# Patient Record
Sex: MALE | Race: OTHER | HISPANIC OR LATINO | Employment: FULL TIME | URBAN - METROPOLITAN AREA
[De-identification: names, ages, dates, MRNs, and addresses within clinical notes are randomized per-mention and may not be internally consistent; named-entity substitution may affect disease eponyms.]

---

## 2019-01-14 ENCOUNTER — NON-PROVIDER VISIT (OUTPATIENT)
Dept: URGENT CARE | Facility: PHYSICIAN GROUP | Age: 39
End: 2019-01-14

## 2019-01-14 DIAGNOSIS — Z02.1 PRE-EMPLOYMENT DRUG SCREENING: ICD-10-CM

## 2019-01-14 LAB
AMP AMPHETAMINE: NORMAL
COC COCAINE: NORMAL
INT CON NEG: NORMAL
INT CON POS: NORMAL
MET METHAMPHETAMINES: NORMAL
OPI OPIATES: NORMAL
PCP PHENCYCLIDINE: NORMAL
POC DRUG COMMENT 753798-OCCUPATIONAL HEALTH: NORMAL
THC: NORMAL

## 2019-01-14 PROCEDURE — 80305 DRUG TEST PRSMV DIR OPT OBS: CPT | Performed by: PHYSICIAN ASSISTANT

## 2020-04-20 ENCOUNTER — HOSPITAL ENCOUNTER (EMERGENCY)
Facility: MEDICAL CENTER | Age: 40
End: 2020-04-20
Attending: EMERGENCY MEDICINE
Payer: MEDICAID

## 2020-04-20 ENCOUNTER — APPOINTMENT (OUTPATIENT)
Dept: RADIOLOGY | Facility: MEDICAL CENTER | Age: 40
End: 2020-04-20
Attending: EMERGENCY MEDICINE
Payer: MEDICAID

## 2020-04-20 VITALS
BODY MASS INDEX: 25.11 KG/M2 | HEIGHT: 67 IN | SYSTOLIC BLOOD PRESSURE: 147 MMHG | DIASTOLIC BLOOD PRESSURE: 108 MMHG | WEIGHT: 160 LBS | OXYGEN SATURATION: 95 % | HEART RATE: 128 BPM | TEMPERATURE: 97 F | RESPIRATION RATE: 16 BRPM

## 2020-04-20 DIAGNOSIS — S63.259A DISLOCATION OF FINGER, INITIAL ENCOUNTER: ICD-10-CM

## 2020-04-20 PROCEDURE — 302874 HCHG BANDAGE ACE 2 OR 3""

## 2020-04-20 PROCEDURE — 99283 EMERGENCY DEPT VISIT LOW MDM: CPT

## 2020-04-20 PROCEDURE — 700111 HCHG RX REV CODE 636 W/ 250 OVERRIDE (IP): Performed by: EMERGENCY MEDICINE

## 2020-04-20 PROCEDURE — 73140 X-RAY EXAM OF FINGER(S): CPT | Mod: LT

## 2020-04-20 PROCEDURE — A9270 NON-COVERED ITEM OR SERVICE: HCPCS | Performed by: EMERGENCY MEDICINE

## 2020-04-20 PROCEDURE — 700102 HCHG RX REV CODE 250 W/ 637 OVERRIDE(OP): Performed by: EMERGENCY MEDICINE

## 2020-04-20 PROCEDURE — 26770 TREAT FINGER DISLOCATION: CPT

## 2020-04-20 RX ORDER — IBUPROFEN 600 MG/1
600 TABLET ORAL ONCE
Status: COMPLETED | OUTPATIENT
Start: 2020-04-20 | End: 2020-04-20

## 2020-04-20 RX ORDER — IBUPROFEN 600 MG/1
600 TABLET ORAL EVERY 6 HOURS PRN
Qty: 30 TAB | Refills: 0 | Status: SHIPPED | OUTPATIENT
Start: 2020-04-20 | End: 2022-08-03

## 2020-04-20 RX ORDER — BUPIVACAINE HYDROCHLORIDE 2.5 MG/ML
10 INJECTION, SOLUTION EPIDURAL; INFILTRATION; INTRACAUDAL ONCE
Status: COMPLETED | OUTPATIENT
Start: 2020-04-20 | End: 2020-04-20

## 2020-04-20 RX ADMIN — BUPIVACAINE HYDROCHLORIDE 10 ML: 2.5 INJECTION, SOLUTION EPIDURAL; INFILTRATION; INTRACAUDAL; PERINEURAL at 22:15

## 2020-04-20 RX ADMIN — IBUPROFEN 600 MG: 600 TABLET ORAL at 22:22
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
## 2020-04-21 NOTE — DISCHARGE INSTRUCTIONS
Wear the splint for the next 2-3 weeks. Return if you have increasing pain, numbness, weakness, or cold blue finger.
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
## 2021-03-11 ENCOUNTER — HOSPITAL ENCOUNTER (OUTPATIENT)
Dept: RADIOLOGY | Facility: MEDICAL CENTER | Age: 41
End: 2021-03-11
Payer: MEDICAID

## 2021-03-11 ENCOUNTER — APPOINTMENT (OUTPATIENT)
Dept: RADIOLOGY | Facility: MEDICAL CENTER | Age: 41
DRG: 200 | End: 2021-03-11
Attending: EMERGENCY MEDICINE
Payer: MEDICAID

## 2021-03-11 ENCOUNTER — HOSPITAL ENCOUNTER (INPATIENT)
Facility: MEDICAL CENTER | Age: 41
LOS: 8 days | DRG: 200 | End: 2021-03-19
Attending: EMERGENCY MEDICINE | Admitting: SURGERY
Payer: MEDICAID

## 2021-03-11 ENCOUNTER — APPOINTMENT (OUTPATIENT)
Dept: RADIOLOGY | Facility: MEDICAL CENTER | Age: 41
DRG: 200 | End: 2021-03-11
Attending: SURGERY
Payer: MEDICAID

## 2021-03-11 DIAGNOSIS — J98.4 PNEUMATOCELE OF LUNG: ICD-10-CM

## 2021-03-11 DIAGNOSIS — S22.41XA CLOSED FRACTURE OF MULTIPLE RIBS OF RIGHT SIDE, INITIAL ENCOUNTER: ICD-10-CM

## 2021-03-11 DIAGNOSIS — S27.0XXA TRAUMATIC PNEUMOTHORAX, INITIAL ENCOUNTER: ICD-10-CM

## 2021-03-11 PROBLEM — J94.2 HEMOTHORAX: Status: ACTIVE | Noted: 2021-03-11

## 2021-03-11 PROBLEM — Z78.9 NO CONTRAINDICATION TO DEEP VEIN THROMBOSIS (DVT) PROPHYLAXIS: Status: ACTIVE | Noted: 2021-03-11

## 2021-03-11 PROBLEM — Z11.9 SCREENING EXAMINATION FOR INFECTIOUS DISEASE: Status: ACTIVE | Noted: 2021-03-11

## 2021-03-11 PROBLEM — S27.2XXA TRAUMATIC HEMOPNEUMOTHORAX: Status: ACTIVE | Noted: 2021-03-11

## 2021-03-11 PROBLEM — T14.90XA TRAUMA: Status: ACTIVE | Noted: 2021-03-11

## 2021-03-11 PROBLEM — S22.49XA MULTIPLE RIB FRACTURES INVOLVING FOUR OR MORE RIBS: Status: ACTIVE | Noted: 2021-03-11

## 2021-03-11 LAB
SARS-COV+SARS-COV-2 AG RESP QL IA.RAPID: NOTDETECTED
SARS-COV-2 RNA RESP QL NAA+PROBE: NOTDETECTED
SPECIMEN SOURCE: NORMAL
SPECIMEN SOURCE: NORMAL

## 2021-03-11 PROCEDURE — 700111 HCHG RX REV CODE 636 W/ 250 OVERRIDE (IP): Performed by: SURGERY

## 2021-03-11 PROCEDURE — 700111 HCHG RX REV CODE 636 W/ 250 OVERRIDE (IP): Performed by: EMERGENCY MEDICINE

## 2021-03-11 PROCEDURE — 71045 X-RAY EXAM CHEST 1 VIEW: CPT

## 2021-03-11 PROCEDURE — 96372 THER/PROPH/DIAG INJ SC/IM: CPT

## 2021-03-11 PROCEDURE — U0005 INFEC AGEN DETEC AMPLI PROBE: HCPCS

## 2021-03-11 PROCEDURE — 700102 HCHG RX REV CODE 250 W/ 637 OVERRIDE(OP): Performed by: SURGERY

## 2021-03-11 PROCEDURE — 700101 HCHG RX REV CODE 250: Performed by: NURSE PRACTITIONER

## 2021-03-11 PROCEDURE — U0003 INFECTIOUS AGENT DETECTION BY NUCLEIC ACID (DNA OR RNA); SEVERE ACUTE RESPIRATORY SYNDROME CORONAVIRUS 2 (SARS-COV-2) (CORONAVIRUS DISEASE [COVID-19]), AMPLIFIED PROBE TECHNIQUE, MAKING USE OF HIGH THROUGHPUT TECHNOLOGIES AS DESCRIBED BY CMS-2020-01-R: HCPCS

## 2021-03-11 PROCEDURE — 99285 EMERGENCY DEPT VISIT HI MDM: CPT

## 2021-03-11 PROCEDURE — C1729 CATH, DRAINAGE: HCPCS | Performed by: SURGERY

## 2021-03-11 PROCEDURE — 96374 THER/PROPH/DIAG INJ IV PUSH: CPT

## 2021-03-11 PROCEDURE — 32551 INSERTION OF CHEST TUBE: CPT

## 2021-03-11 PROCEDURE — 3E033GC INTRODUCTION OF OTHER THERAPEUTIC SUBSTANCE INTO PERIPHERAL VEIN, PERCUTANEOUS APPROACH: ICD-10-PCS | Performed by: EMERGENCY MEDICINE

## 2021-03-11 PROCEDURE — A9270 NON-COVERED ITEM OR SERVICE: HCPCS | Performed by: SURGERY

## 2021-03-11 PROCEDURE — 770006 HCHG ROOM/CARE - MED/SURG/GYN SEMI*

## 2021-03-11 PROCEDURE — C9803 HOPD COVID-19 SPEC COLLECT: HCPCS | Performed by: EMERGENCY MEDICINE

## 2021-03-11 PROCEDURE — 87426 SARSCOV CORONAVIRUS AG IA: CPT

## 2021-03-11 PROCEDURE — 0W9930Z DRAINAGE OF RIGHT PLEURAL CAVITY WITH DRAINAGE DEVICE, PERCUTANEOUS APPROACH: ICD-10-PCS | Performed by: SURGERY

## 2021-03-11 RX ORDER — OXYCODONE HYDROCHLORIDE 5 MG/1
5 TABLET ORAL
Status: DISCONTINUED | OUTPATIENT
Start: 2021-03-11 | End: 2021-03-19 | Stop reason: HOSPADM

## 2021-03-11 RX ORDER — LIDOCAINE 50 MG/G
1 PATCH TOPICAL EVERY 24 HOURS
Status: DISCONTINUED | OUTPATIENT
Start: 2021-03-11 | End: 2021-03-19 | Stop reason: HOSPADM

## 2021-03-11 RX ORDER — OXYCODONE HYDROCHLORIDE 10 MG/1
10 TABLET ORAL
Status: DISCONTINUED | OUTPATIENT
Start: 2021-03-11 | End: 2021-03-14

## 2021-03-11 RX ORDER — POLYETHYLENE GLYCOL 3350 17 G/17G
1 POWDER, FOR SOLUTION ORAL 2 TIMES DAILY
Status: DISCONTINUED | OUTPATIENT
Start: 2021-03-11 | End: 2021-03-19 | Stop reason: HOSPADM

## 2021-03-11 RX ORDER — IBUPROFEN 800 MG/1
800 TABLET ORAL 3 TIMES DAILY PRN
Status: DISCONTINUED | OUTPATIENT
Start: 2021-03-16 | End: 2021-03-19 | Stop reason: HOSPADM

## 2021-03-11 RX ORDER — ACETAMINOPHEN 500 MG
1000 TABLET ORAL EVERY 6 HOURS PRN
Status: DISCONTINUED | OUTPATIENT
Start: 2021-03-16 | End: 2021-03-19 | Stop reason: HOSPADM

## 2021-03-11 RX ORDER — AMOXICILLIN 250 MG
1 CAPSULE ORAL
Status: DISCONTINUED | OUTPATIENT
Start: 2021-03-11 | End: 2021-03-19 | Stop reason: HOSPADM

## 2021-03-11 RX ORDER — IBUPROFEN 800 MG/1
800 TABLET ORAL 3 TIMES DAILY
Status: COMPLETED | OUTPATIENT
Start: 2021-03-11 | End: 2021-03-16

## 2021-03-11 RX ORDER — ENEMA 19; 7 G/133ML; G/133ML
1 ENEMA RECTAL
Status: DISCONTINUED | OUTPATIENT
Start: 2021-03-11 | End: 2021-03-19 | Stop reason: HOSPADM

## 2021-03-11 RX ORDER — ONDANSETRON 2 MG/ML
4 INJECTION INTRAMUSCULAR; INTRAVENOUS EVERY 4 HOURS PRN
Status: DISCONTINUED | OUTPATIENT
Start: 2021-03-11 | End: 2021-03-19 | Stop reason: HOSPADM

## 2021-03-11 RX ORDER — ACETAMINOPHEN 500 MG
1000 TABLET ORAL EVERY 6 HOURS
Status: DISPENSED | OUTPATIENT
Start: 2021-03-11 | End: 2021-03-16

## 2021-03-11 RX ORDER — DOCUSATE SODIUM 100 MG/1
100 CAPSULE, LIQUID FILLED ORAL 2 TIMES DAILY
Status: DISCONTINUED | OUTPATIENT
Start: 2021-03-11 | End: 2021-03-19 | Stop reason: HOSPADM

## 2021-03-11 RX ORDER — IBUPROFEN 200 MG
400 TABLET ORAL EVERY 4 HOURS PRN
Status: ON HOLD | COMMUNITY
End: 2021-03-19

## 2021-03-11 RX ORDER — BISACODYL 10 MG
10 SUPPOSITORY, RECTAL RECTAL
Status: DISCONTINUED | OUTPATIENT
Start: 2021-03-11 | End: 2021-03-19 | Stop reason: HOSPADM

## 2021-03-11 RX ORDER — AMOXICILLIN 250 MG
1 CAPSULE ORAL NIGHTLY
Status: DISCONTINUED | OUTPATIENT
Start: 2021-03-11 | End: 2021-03-19 | Stop reason: HOSPADM

## 2021-03-11 RX ADMIN — IBUPROFEN 800 MG: 600 TABLET ORAL at 18:51

## 2021-03-11 RX ADMIN — OXYCODONE 5 MG: 5 TABLET ORAL at 22:32

## 2021-03-11 RX ADMIN — ACETAMINOPHEN 1000 MG: 500 TABLET, FILM COATED ORAL at 18:51

## 2021-03-11 RX ADMIN — ENOXAPARIN SODIUM 30 MG: 30 INJECTION SUBCUTANEOUS at 18:51

## 2021-03-11 RX ADMIN — PROPOFOL 180 MG: 10 INJECTION, EMULSION INTRAVENOUS at 17:51

## 2021-03-11 RX ADMIN — LIDOCAINE 1 PATCH: 50 PATCH TOPICAL at 22:31

## 2021-03-11 ASSESSMENT — PAIN DESCRIPTION - PAIN TYPE: TYPE: ACUTE PAIN

## 2021-03-11 ASSESSMENT — LIFESTYLE VARIABLES
ALCOHOL_USE: YES
TOTAL SCORE: 0
HAVE PEOPLE ANNOYED YOU BY CRITICIZING YOUR DRINKING: NO
DOES PATIENT WANT TO STOP DRINKING: NO
TOTAL SCORE: 0
AVERAGE NUMBER OF DAYS PER WEEK YOU HAVE A DRINK CONTAINING ALCOHOL: 7
HOW MANY TIMES IN THE PAST YEAR HAVE YOU HAD 5 OR MORE DRINKS IN A DAY: 50
CONSUMPTION TOTAL: POSITIVE
EVER FELT BAD OR GUILTY ABOUT YOUR DRINKING: NO
TOTAL SCORE: 0
ON A TYPICAL DAY WHEN YOU DRINK ALCOHOL HOW MANY DRINKS DO YOU HAVE: 5
EVER HAD A DRINK FIRST THING IN THE MORNING TO STEADY YOUR NERVES TO GET RID OF A HANGOVER: NO
HAVE YOU EVER FELT YOU SHOULD CUT DOWN ON YOUR DRINKING: NO
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
## 2021-03-12 ENCOUNTER — APPOINTMENT (OUTPATIENT)
Dept: RADIOLOGY | Facility: MEDICAL CENTER | Age: 41
DRG: 200 | End: 2021-03-12
Attending: SURGERY
Payer: MEDICAID

## 2021-03-12 LAB
ANION GAP SERPL CALC-SCNC: 13 MMOL/L (ref 7–16)
BASOPHILS # BLD AUTO: 0.8 % (ref 0–1.8)
BASOPHILS # BLD: 0.06 K/UL (ref 0–0.12)
BUN SERPL-MCNC: 13 MG/DL (ref 8–22)
CALCIUM SERPL-MCNC: 8.8 MG/DL (ref 8.5–10.5)
CHLORIDE SERPL-SCNC: 97 MMOL/L (ref 96–112)
CO2 SERPL-SCNC: 26 MMOL/L (ref 20–33)
CREAT SERPL-MCNC: 0.75 MG/DL (ref 0.5–1.4)
EOSINOPHIL # BLD AUTO: 0.21 K/UL (ref 0–0.51)
EOSINOPHIL NFR BLD: 2.7 % (ref 0–6.9)
ERYTHROCYTE [DISTWIDTH] IN BLOOD BY AUTOMATED COUNT: 45.3 FL (ref 35.9–50)
GLUCOSE SERPL-MCNC: 101 MG/DL (ref 65–99)
HCT VFR BLD AUTO: 46.2 % (ref 42–52)
HGB BLD-MCNC: 15.3 G/DL (ref 14–18)
IMM GRANULOCYTES # BLD AUTO: 0.04 K/UL (ref 0–0.11)
IMM GRANULOCYTES NFR BLD AUTO: 0.5 % (ref 0–0.9)
LYMPHOCYTES # BLD AUTO: 1.54 K/UL (ref 1–4.8)
LYMPHOCYTES NFR BLD: 20 % (ref 22–41)
MCH RBC QN AUTO: 33.2 PG (ref 27–33)
MCHC RBC AUTO-ENTMCNC: 33.1 G/DL (ref 33.7–35.3)
MCV RBC AUTO: 100.2 FL (ref 81.4–97.8)
MONOCYTES # BLD AUTO: 0.76 K/UL (ref 0–0.85)
MONOCYTES NFR BLD AUTO: 9.9 % (ref 0–13.4)
NEUTROPHILS # BLD AUTO: 5.1 K/UL (ref 1.82–7.42)
NEUTROPHILS NFR BLD: 66.1 % (ref 44–72)
NRBC # BLD AUTO: 0 K/UL
NRBC BLD-RTO: 0 /100 WBC
PLATELET # BLD AUTO: 249 K/UL (ref 164–446)
PMV BLD AUTO: 9.2 FL (ref 9–12.9)
POTASSIUM SERPL-SCNC: 3.7 MMOL/L (ref 3.6–5.5)
RBC # BLD AUTO: 4.61 M/UL (ref 4.7–6.1)
SODIUM SERPL-SCNC: 136 MMOL/L (ref 135–145)
WBC # BLD AUTO: 7.7 K/UL (ref 4.8–10.8)

## 2021-03-12 PROCEDURE — 71045 X-RAY EXAM CHEST 1 VIEW: CPT

## 2021-03-12 PROCEDURE — 700101 HCHG RX REV CODE 250: Performed by: NURSE PRACTITIONER

## 2021-03-12 PROCEDURE — A9270 NON-COVERED ITEM OR SERVICE: HCPCS | Performed by: SURGERY

## 2021-03-12 PROCEDURE — 85025 COMPLETE CBC W/AUTO DIFF WBC: CPT

## 2021-03-12 PROCEDURE — 700111 HCHG RX REV CODE 636 W/ 250 OVERRIDE (IP): Performed by: SURGERY

## 2021-03-12 PROCEDURE — 770006 HCHG ROOM/CARE - MED/SURG/GYN SEMI*

## 2021-03-12 PROCEDURE — 94669 MECHANICAL CHEST WALL OSCILL: CPT

## 2021-03-12 PROCEDURE — 94760 N-INVAS EAR/PLS OXIMETRY 1: CPT

## 2021-03-12 PROCEDURE — 36415 COLL VENOUS BLD VENIPUNCTURE: CPT

## 2021-03-12 PROCEDURE — 700102 HCHG RX REV CODE 250 W/ 637 OVERRIDE(OP): Performed by: SURGERY

## 2021-03-12 PROCEDURE — 80048 BASIC METABOLIC PNL TOTAL CA: CPT

## 2021-03-12 RX ADMIN — ENOXAPARIN SODIUM 30 MG: 30 INJECTION SUBCUTANEOUS at 04:45

## 2021-03-12 RX ADMIN — IBUPROFEN 800 MG: 600 TABLET ORAL at 12:29

## 2021-03-12 RX ADMIN — ACETAMINOPHEN 1000 MG: 500 TABLET, FILM COATED ORAL at 01:11

## 2021-03-12 RX ADMIN — ACETAMINOPHEN 1000 MG: 500 TABLET, FILM COATED ORAL at 17:33

## 2021-03-12 RX ADMIN — OXYCODONE 5 MG: 5 TABLET ORAL at 04:45

## 2021-03-12 RX ADMIN — IBUPROFEN 800 MG: 600 TABLET ORAL at 04:45

## 2021-03-12 RX ADMIN — DOCUSATE SODIUM 100 MG: 100 CAPSULE, LIQUID FILLED ORAL at 04:45

## 2021-03-12 RX ADMIN — OXYCODONE 5 MG: 5 TABLET ORAL at 21:04

## 2021-03-12 RX ADMIN — IBUPROFEN 800 MG: 600 TABLET ORAL at 17:33

## 2021-03-12 RX ADMIN — ENOXAPARIN SODIUM 30 MG: 30 INJECTION SUBCUTANEOUS at 17:33

## 2021-03-12 RX ADMIN — LIDOCAINE 1 PATCH: 50 PATCH TOPICAL at 21:02

## 2021-03-12 RX ADMIN — ACETAMINOPHEN 1000 MG: 500 TABLET, FILM COATED ORAL at 04:45

## 2021-03-12 RX ADMIN — ACETAMINOPHEN 1000 MG: 500 TABLET, FILM COATED ORAL at 23:47

## 2021-03-12 RX ADMIN — ACETAMINOPHEN 1000 MG: 500 TABLET, FILM COATED ORAL at 12:29

## 2021-03-12 ASSESSMENT — COPD QUESTIONNAIRES
COPD SCREENING SCORE: 2
DO YOU EVER COUGH UP ANY MUCUS OR PHLEGM?: NO/ONLY WITH OCCASIONAL COLDS OR INFECTIONS
DURING THE PAST 4 WEEKS HOW MUCH DID YOU FEEL SHORT OF BREATH: NONE/LITTLE OF THE TIME
HAVE YOU SMOKED AT LEAST 100 CIGARETTES IN YOUR ENTIRE LIFE: YES

## 2021-03-12 ASSESSMENT — ENCOUNTER SYMPTOMS
VOMITING: 0
NAUSEA: 0
SHORTNESS OF BREATH: 0
CHILLS: 0
DOUBLE VISION: 0
FEVER: 0
SENSORY CHANGE: 0
MYALGIAS: 1
FOCAL WEAKNESS: 0
BLURRED VISION: 0
ABDOMINAL PAIN: 0
SPEECH CHANGE: 0

## 2021-03-12 ASSESSMENT — PAIN DESCRIPTION - PAIN TYPE
TYPE: ACUTE PAIN

## 2021-03-12 ASSESSMENT — PATIENT HEALTH QUESTIONNAIRE - PHQ9
SUM OF ALL RESPONSES TO PHQ9 QUESTIONS 1 AND 2: 0
2. FEELING DOWN, DEPRESSED, IRRITABLE, OR HOPELESS: NOT AT ALL
1. LITTLE INTEREST OR PLEASURE IN DOING THINGS: NOT AT ALL

## 2021-03-12 ASSESSMENT — LIFESTYLE VARIABLES: SUBSTANCE_ABUSE: 1

## 2021-03-12 ASSESSMENT — COGNITIVE AND FUNCTIONAL STATUS - GENERAL
SUGGESTED CMS G CODE MODIFIER DAILY ACTIVITY: CH
DAILY ACTIVITIY SCORE: 24
MOBILITY SCORE: 24
SUGGESTED CMS G CODE MODIFIER MOBILITY: CH

## 2021-03-12 NOTE — CARE PLAN
Problem: Safety  Goal: Will remain free from injury  Outcome: PROGRESSING AS EXPECTED  Goal: Will remain free from falls  Outcome: PROGRESSING AS EXPECTED   Updated about POC. Reinforce call light use. Pt acknowledged understanding'    Problem: Respiratory:  Goal: Respiratory status will improve  Outcome: PROGRESSING AS EXPECTED  Encouraged IS use. O2 on RA

## 2021-03-12 NOTE — ASSESSMENT & PLAN NOTE
Admission SARS-CoV-2 testing negative. Repeat SARS-CoV-2 testing not indicated. Isolation precautions de-escalated.

## 2021-03-12 NOTE — PROGRESS NOTES
2 RN skin check complete.   Devices in place . PIV  Skin assessed under devices. n/a  Confirmed pressure ulcers found on . n/a    RT CT in place  No other skin breakdown noted

## 2021-03-12 NOTE — ASSESSMENT & PLAN NOTE
Acute fractures of right 2nd through 7th ribs.  Aggressive multimodal pain management, and pulmonary hygiene.    Serial chest radiographs.
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
## 2021-03-12 NOTE — CARE PLAN
Problem: Respiratory:  Goal: Respiratory status will improve  Outcome: PROGRESSING AS EXPECTED  Note: Turning, coughing, deep breathing.     Problem: Pain Management  Goal: Pain level will decrease to patient's comfort goal  Outcome: PROGRESSING AS EXPECTED  Flowsheets (Taken 3/12/2021 1024)  Comfort Goal:   Perform Activity   Sleep Comfortably   Comfort with Movement  Non Verbal Scale:   Calm   Unlabored Breathing  Pain Rating Scale (NPRS): 5  Note: Frequently assessing pain

## 2021-03-12 NOTE — OP REPORT
Surgeon:Eleazar Harvey MD  Preoperative diagnosis: Traumatic hemopneumothorax  Postoperative diagnosis: Traumatic hemopneumothorax  Procedure: Placement of a chest tube  Anesthesia: 1% lidocaine by local infiltration and sedation with propofol  Indications: Patient status post trauma including right rib fractures and associated enlarging traumatic pneumothorax. Placement of a chest tube is indicated.  Narrative: The right chest was prepped with chlorhexidine prep and sterile drapes. The patient was sedated with propofol and the area around the mid axillary line and 5th intercostal space was infiltrated with 1% lidocaine. An incision was made. Sharp dissection proceeded to the level of the intercostals. The chest was then entered bluntly. A 24 Georgian chest tube was placed and secured at 18 cm at the skin. The patient tolerated the procedure well without apparent complication. Chest x-ray is pending.

## 2021-03-12 NOTE — H&P
"Trauma Surgery History and Physical    Chief Complaint: Presumed fall.    History of Present Illness: The patient is a 40-year-old man who apparently fell 2 days ago.  He does not remember as he was \"partying\" significantly.  He experienced increasing pain in his right chest and sought medical attention.  He was evaluated in outside facility where he was noted to have multiple right-sided rib fractures as well as a moderate pneumothorax.  Time my exam he does complain of pain in his right chest but denies significant shortness of breath.  He denies neck pain, abdominal pain, numbness, tingling, or weakness.  He is unsure whether he lost consciousness as he does not remember the fall itself.      Triage Category: The patient was triaged as a Non-Trauma activation. An expeditious primary and secondary survey with required adjuncts was conducted. See Trauma Narrator for full details.    Past Medical History: None    Past Surgical History: None    Allergies: No Known Allergies    Current Medications:    Home Medications     Reviewed by Michelle Howard (Pharmacy Tech) on 03/11/21 at 1523  Med List Status: Complete   Medication Last Dose Status   hydrocodone-acetaminophen (NORCO) 5-325 MG TABS per tablet NOT TAKING Active   ibuprofen (MOTRIN) 200 MG Tab 3/11/2021 Active   ibuprofen (MOTRIN) 600 MG Tab NOT TAKING Active                Family History: family history is not on file.    Social History:  reports that he has been smoking cigarettes. He has smoked for the past 10.00 years. He has never used smokeless tobacco. He reports current alcohol use. He reports current drug use. Drug: Inhaled.    Review of Systems: Comprehensive review of systems is negative with the exception of the aforementioned HPI, PMH, and PSH bullets in accordance with CMS guidelines.    Physical Examination:     Constitutional:     Vital Signs: /89   Pulse 89   Temp 36.8 °C (98.2 °F) (Temporal)   Resp 20   Ht 1.702 m (5' 7\")   Wt " 74.8 kg (165 lb)   SpO2 93%    General Appearance: The patient is a pleasant  and calm appearing man in no critical distress.  HEENT:    No significant external craniofacial trauma. The pupils are equal, round, and reactive to light bilaterally. The extraocular muscles are intact bilaterally. The ear canals and tympanic membranes are clear bilaterally. The nares and oropharynx are clear. The midface and jaw are stable.  No malocclusion is evident.  Neck:    The cervical spine is supple and non tender.  Respiratory:   Inspection: Unlabored respirations, no intercostal retractions, paradoxical motion, or accessory muscle use.   Palpation:  The chest is nontender. The clavicles are non deformed bilaterally.   Auscultation: clear to auscultation.  Cardiovascular:   Inspection: The skin is warm.  Auscultation: Regular rate and rhythm.   Peripheral Pulses: Normal.   Abdomen:   Inspection: Abdominal inspection reveals no abrasions, contusions, lacerations or penetrating wounds.   Palpation: Palpation is remarkable for no significant tenderness, guarding, or peritoneal findings.  Musculoskeletal:   The pelvis is stable. No significant angulation, deformity, or soft tissue injury involving the upper and lower extremities.  Back:   The thoracolumbar spine was examined utilizing spinal motion restriction. Examination is remarkable for no significant tenderness, swelling, or deformity in the thoracolumbar region.  Skin:    Examination of the skin reveals no significant abrasions, contusion, lacerations, or other soft tissue injury.  Neurologic:    Jonathan Coma Scale (GCS) 15.    Neurologic examination reveals no focal deficits noted.  Psychiatric:   The patient does not appear depressed or anxious.    Laboratory Values:                      Imaging:   DX-CHEST-PORTABLE (1 VIEW)   Final Result      1.  Moderate right pneumothorax, similar to recent CT.      2.  Right lower lung opacification is consistent with traumatic  pneumatocele seen on CT.      3.  Multiple right rib fractures with subcutaneous air overlying the right hemithorax.      OUTSIDE IMAGES-CT CHEST   Final Result          Assessment and Plan:   40-year-old man status post a fall.  He does have injuries includin.  6 right-sided rib fractures-Multimodal pain management and aggressive pulmonary toilet will be instituted.  Serial chest x-rays will be obtained  2.  Right-sided pneumothorax-chest tube will be placed.  He is overall stable and a candidate for floor admission.    Disposition: General Surgery Unit ([facility]).  Trauma tertiary survey.    Critical Care Time: 33 minutes excluding procedures.       ____________________________________     Eleazar Harvey M.D.    DD: 3/11/2021  6:01 PM

## 2021-03-12 NOTE — PROGRESS NOTES
"TRAUMA TERTIARY SURVEY     Mental status adequate for full examination?: Yes    Spine cleared (radiologically and/or clinically): Yes    PHYSICAL EXAMINATION:  Vitals: /85   Pulse 82   Temp 36.3 °C (97.3 °F) (Temporal)   Resp 18   Ht 1.702 m (5' 7\")   Wt 73.6 kg (162 lb 4.1 oz)   SpO2 94%   BMI 25.41 kg/m²   Constitutional:     General Appearance: appears stated age, is in no apparent distress, is well developed and well nourished.  HEENT:     No significant external craniofacial trauma. The pupils are equal, round, and reactive to light bilaterally. The extraocular muscles are intact bilaterally.. The nares and oropharynx are clear. The midface and jaw are stable. No malocclusion is evident.  Neck:    The cervical spine is supple and non tender. Normal range of motion. No posterior midline cervical-spine tenderness, no evidence of intoxication, normal level of alertness ([place] Coma Scale 15), no focal neurologic deficit, and no painful distracting injuries. The trachea is midline. No significant abrasions, lacerations, contusions, punctures, or swelling. No crepitance. No jugulovenous distension.  Respiratory:   Inspection: Unlabored respirations, no intercostal retractions, paradoxical motion, or accessory muscle use. Chest tube to suction with no air leak.  25 cc output since placement.   IS 1000 cc.   Palpation:  The chest is tender - rib and cheat tube insertion site. The clavicles are non deformed bilaterally..   Auscultation: Diminished on the right  Cardiovascular:   Auscultation: regular rate and rhythm.   Peripheral Pulses: Normal.   Abdomen:   Abdomen is soft, nontender, without organomegaly or masses.  Musculoskeletal:   The pelvis is stable.  No significant angulation, deformity, or soft tissue injury involving the upper and lower extremities. Normal range of motion.   Back:   The thoracolumbar spine was examined. Examination is remarkable for no significant tenderness, swelling, or " deformity in the thoracolumbar region.  Skin:   The skin is warm and dry.  Neurologic:    Jonathan Coma Scale (GCS) 15 [id number]. Neurologic examination revealed no focal deficits noted, mental status intact.  Psychiatric:   The patient does not appear depressed or anxious.    IMAGING:  DX-CHEST-PORTABLE (1 VIEW)   Final Result         1.  Right pulmonary infiltrates, similar to prior study.   2.  Trace right apical pneumothorax, thoracostomy tube remains in place   3.  Soft tissue gas in the right chest wall      DX-CHEST-PORTABLE (1 VIEW)   Final Result      1.  Right pneumothorax has resolved following chest tube placement.   2.  Mild right basilar atelectasis.      DX-CHEST-PORTABLE (1 VIEW)   Final Result      1.  Moderate right pneumothorax, similar to recent CT.      2.  Right lower lung opacification is consistent with traumatic pneumatocele seen on CT.      3.  Multiple right rib fractures with subcutaneous air overlying the right hemithorax.      OUTSIDE IMAGES-CT CHEST   Final Result        All current laboratory studies/radiology exams reviewed: Yes    Completed Consultations:  NA     Pending Consultations:  NA    Newly Identified Diagnoses and Injuries:  None    TOTAL RAP SCORE:  RAP Score Total: 4      ETOH Screening  CAGE Score: 0  Assessment complete date: 3/12/2021  Patient response to intervention: A drink a 2-3 beers a night and more on the weekends.   Patient demonstrates understanding of intervention. Patient does not agree to follow-up.   has not been contacted. Follow up with: PCP, [facility]  Total ETOH intervention time: 15 - 30 mintues

## 2021-03-12 NOTE — PROGRESS NOTES
Pt arrived to floor around 2200. A&ox 4  Ambulated from gurney to bed  Rt CT to suction.   O2 on RA  Tolerating diet  Pain controlled with oxy 5  Oriented to room. Call light within reach. Hourly rounding in place

## 2021-03-12 NOTE — ASSESSMENT & PLAN NOTE
Moderate right pneumothorax. Right hemothorax.  Tube thoracostomy placed in ED with follow up resolution of pneumothorax.  3/12 Chest tube to water seal  3/13 Chest tube returned to suction for slightly worse mild to moderate right apical pneumothorax.  3/14 CXR with no appreciable right pneumothorax. Chest tube to water seal.   3/15 Chest tube returned to suction for new small right pneumothorax.   3/16 No pneumothorax on AM chest x-ray. Continue chest tube to suction.   3/17 Chest x-ray without pneumothorax, chest tube to water seal   3/18 Chest x-ray without pneumothorax  - Interval removal of chest tube  3/19 Chest x-ray without pneumothorax   Aggressive pulmonary hygiene. Serial chest radiographs.

## 2021-03-12 NOTE — ED NOTES
Report received from PIYUSH Morrison. Assumed care of pt, pt resting comfortably in bed. Xray at bedside.

## 2021-03-12 NOTE — ED NOTES
Consent obtained for right chest tube placement. ERP RT and trauma surgeon at bedside for tube placement.

## 2021-03-12 NOTE — PROGRESS NOTES
Bedside report received.  Assessment complete.  A&O x 4. Patient calls appropriately.  Patient up self with no assist.   Patient has 5/10 pain. Declines interventions.  Denies N&V. Patient tolerating regular diet.  Right chest tube with serosanguinous output. Dressing CDI. No leaking or subcutaneous emphysema noted.  + void   Patient denies SOB.  Review plan with of care with patient. Call light and personal belongings with in reach. Hourly rounding in place. All needs met at this time.

## 2021-03-13 ENCOUNTER — APPOINTMENT (OUTPATIENT)
Dept: RADIOLOGY | Facility: MEDICAL CENTER | Age: 41
DRG: 200 | End: 2021-03-13
Attending: SURGERY
Payer: MEDICAID

## 2021-03-13 ENCOUNTER — APPOINTMENT (OUTPATIENT)
Dept: RADIOLOGY | Facility: MEDICAL CENTER | Age: 41
DRG: 200 | End: 2021-03-13
Attending: NURSE PRACTITIONER
Payer: MEDICAID

## 2021-03-13 LAB
ANION GAP SERPL CALC-SCNC: 8 MMOL/L (ref 7–16)
BUN SERPL-MCNC: 12 MG/DL (ref 8–22)
CALCIUM SERPL-MCNC: 8.7 MG/DL (ref 8.5–10.5)
CHLORIDE SERPL-SCNC: 101 MMOL/L (ref 96–112)
CO2 SERPL-SCNC: 27 MMOL/L (ref 20–33)
CREAT SERPL-MCNC: 0.66 MG/DL (ref 0.5–1.4)
GLUCOSE SERPL-MCNC: 94 MG/DL (ref 65–99)
POTASSIUM SERPL-SCNC: 3.7 MMOL/L (ref 3.6–5.5)
SODIUM SERPL-SCNC: 136 MMOL/L (ref 135–145)

## 2021-03-13 PROCEDURE — 700101 HCHG RX REV CODE 250: Performed by: NURSE PRACTITIONER

## 2021-03-13 PROCEDURE — 94760 N-INVAS EAR/PLS OXIMETRY 1: CPT

## 2021-03-13 PROCEDURE — 71045 X-RAY EXAM CHEST 1 VIEW: CPT

## 2021-03-13 PROCEDURE — 700102 HCHG RX REV CODE 250 W/ 637 OVERRIDE(OP): Performed by: SURGERY

## 2021-03-13 PROCEDURE — 700111 HCHG RX REV CODE 636 W/ 250 OVERRIDE (IP): Performed by: SURGERY

## 2021-03-13 PROCEDURE — A9270 NON-COVERED ITEM OR SERVICE: HCPCS | Performed by: SURGERY

## 2021-03-13 PROCEDURE — 94669 MECHANICAL CHEST WALL OSCILL: CPT

## 2021-03-13 PROCEDURE — 36415 COLL VENOUS BLD VENIPUNCTURE: CPT

## 2021-03-13 PROCEDURE — 80048 BASIC METABOLIC PNL TOTAL CA: CPT

## 2021-03-13 PROCEDURE — 700101 HCHG RX REV CODE 250: Performed by: SURGERY

## 2021-03-13 PROCEDURE — 770006 HCHG ROOM/CARE - MED/SURG/GYN SEMI*

## 2021-03-13 RX ADMIN — IBUPROFEN 800 MG: 600 TABLET ORAL at 18:00

## 2021-03-13 RX ADMIN — ACETAMINOPHEN 1000 MG: 500 TABLET, FILM COATED ORAL at 13:28

## 2021-03-13 RX ADMIN — DOCUSATE SODIUM 100 MG: 100 CAPSULE, LIQUID FILLED ORAL at 18:00

## 2021-03-13 RX ADMIN — ACETAMINOPHEN 1000 MG: 500 TABLET, FILM COATED ORAL at 23:04

## 2021-03-13 RX ADMIN — ACETAMINOPHEN 1000 MG: 500 TABLET, FILM COATED ORAL at 05:07

## 2021-03-13 RX ADMIN — IBUPROFEN 800 MG: 600 TABLET ORAL at 13:29

## 2021-03-13 RX ADMIN — OXYCODONE 5 MG: 5 TABLET ORAL at 09:10

## 2021-03-13 RX ADMIN — POLYETHYLENE GLYCOL 3350 1 PACKET: 17 POWDER, FOR SOLUTION ORAL at 18:01

## 2021-03-13 RX ADMIN — ENOXAPARIN SODIUM 30 MG: 30 INJECTION SUBCUTANEOUS at 18:00

## 2021-03-13 RX ADMIN — LIDOCAINE 1 PATCH: 50 PATCH TOPICAL at 20:48

## 2021-03-13 RX ADMIN — IBUPROFEN 800 MG: 600 TABLET ORAL at 05:07

## 2021-03-13 RX ADMIN — ACETAMINOPHEN 1000 MG: 500 TABLET, FILM COATED ORAL at 18:00

## 2021-03-13 ASSESSMENT — ENCOUNTER SYMPTOMS
ABDOMINAL PAIN: 0
FOCAL WEAKNESS: 0
NAUSEA: 0
CHILLS: 0
FEVER: 0
SPEECH CHANGE: 0
BLURRED VISION: 0
SHORTNESS OF BREATH: 0
SENSORY CHANGE: 0
MYALGIAS: 1
DOUBLE VISION: 0
VOMITING: 0

## 2021-03-13 ASSESSMENT — PAIN DESCRIPTION - PAIN TYPE
TYPE: ACUTE PAIN
TYPE: ACUTE PAIN

## 2021-03-13 ASSESSMENT — LIFESTYLE VARIABLES: SUBSTANCE_ABUSE: 1

## 2021-03-13 NOTE — PROGRESS NOTES
Interval decrease in size of right-sided pneumothorax on CXR imaging with return of chest tube to suction.

## 2021-03-13 NOTE — CARE PLAN
Problem: Communication  Goal: The ability to communicate needs accurately and effectively will improve  3/13/2021 1218 by Keke Nixon R.N.  Outcome: PROGRESSING AS EXPECTED  3/13/2021 1132 by Keke Nixon R.N.  Outcome: PROGRESSING AS EXPECTED     Problem: Safety  Goal: Will remain free from injury  3/13/2021 1218 by Keke Nixon R.N.  Outcome: PROGRESSING AS EXPECTED  3/13/2021 1132 by Keke Nixon R.N.  Outcome: PROGRESSING AS EXPECTED  Goal: Will remain free from falls  Outcome: PROGRESSING AS EXPECTED     Problem: Venous Thromboembolism (VTW)/Deep Vein Thrombosis (DVT) Prevention:  Goal: Patient will participate in Venous Thrombosis (VTE)/Deep Vein Thrombosis (DVT)Prevention Measures  Outcome: PROGRESSING AS EXPECTED

## 2021-03-13 NOTE — PROGRESS NOTES
Assumed care at 1845. Pt resting in bed. A&ox 4  Ambulating in hallways independently  O2 on RA  Rt CT in place to waterseal. Dressing changed  Voiding adequately  bm today  Tolerating diet  Call light within reach. Hourly rounding in place

## 2021-03-13 NOTE — PROGRESS NOTES
Trauma / Surgical Daily Progress Note    Date of Service  3/13/2021    Chief Complaint  40 y.o. male admitted 3/11/2021 with right pneumothorax with rib fractures.    Interval Events    CXR reviewed with radiologist. Mild to moderate right apical pneumothorax, worse than prior.  Room air.  No acute respiratory distress    - Chest tube returned to suction.  - Follow up CXR pending.  - Disposition: home when medically cleared  - Counseled.      Review of Systems  Review of Systems   Constitutional: Negative for chills and fever.   HENT: Negative for hearing loss.    Eyes: Negative for blurred vision and double vision.   Respiratory: Negative for shortness of breath.    Cardiovascular: Negative for chest pain.   Gastrointestinal: Negative for abdominal pain, nausea and vomiting.   Genitourinary: Negative for dysuria.   Musculoskeletal: Positive for myalgias (chest tube insertion site / rib fractures).   Skin: Negative for rash.   Neurological: Negative for sensory change, speech change and focal weakness.   Psychiatric/Behavioral: Positive for substance abuse.        Vital Signs  Temp:  [35.9 °C (96.7 °F)-36.6 °C (97.9 °F)] 36.6 °C (97.9 °F)  Pulse:  [67-89] 77  Resp:  [16-20] 20  BP: (104-123)/(58-85) 105/75  SpO2:  [91 %-97 %] 96 %    Physical Exam  Physical Exam  Vitals and nursing note reviewed.   Constitutional:       General: He is awake. He is not in acute distress.     Appearance: Normal appearance. He is not ill-appearing or toxic-appearing.   HENT:      Head: Normocephalic.      Nose: Nose normal.      Mouth/Throat:      Mouth: Mucous membranes are moist.      Pharynx: Oropharynx is clear.   Eyes:      Extraocular Movements: Extraocular movements intact.      Conjunctiva/sclera: Conjunctivae normal.      Pupils: Pupils are equal, round, and reactive to light.   Cardiovascular:      Rate and Rhythm: Normal rate and regular rhythm.      Pulses: Normal pulses.      Heart sounds: Normal heart sounds.   Pulmonary:       Effort: Pulmonary effort is normal. No respiratory distress.      Breath sounds: Normal breath sounds.      Comments: Chest tube to water seal with no air leak.   Chest:      Chest wall: Tenderness present.   Abdominal:      General: There is no distension.      Tenderness: There is no abdominal tenderness. There is no guarding or rebound.   Musculoskeletal:         General: No swelling, tenderness or deformity. Normal range of motion.      Cervical back: Normal range of motion.   Skin:     General: Skin is warm and dry.      Capillary Refill: Capillary refill takes less than 2 seconds.   Neurological:      General: No focal deficit present.      Mental Status: He is alert.      GCS: GCS eye subscore is 4. GCS verbal subscore is 5. GCS motor subscore is 6.   Psychiatric:         Mood and Affect: Mood normal.         Behavior: Behavior normal. Behavior is cooperative.         Laboratory  Recent Results (from the past 24 hour(s))   Basic Metabolic Panel (BMP): Tomorrow AM    Collection Time: 03/13/21  2:44 AM   Result Value Ref Range    Sodium 136 135 - 145 mmol/L    Potassium 3.7 3.6 - 5.5 mmol/L    Chloride 101 96 - 112 mmol/L    Co2 27 20 - 33 mmol/L    Glucose 94 65 - 99 mg/dL    Bun 12 8 - 22 mg/dL    Creatinine 0.66 0.50 - 1.40 mg/dL    Calcium 8.7 8.5 - 10.5 mg/dL    Anion Gap 8.0 7.0 - 16.0   ESTIMATED GFR    Collection Time: 03/13/21  2:44 AM   Result Value Ref Range    GFR If African American >60 >60 mL/min/1.73 m 2    GFR If Non African American >60 >60 mL/min/1.73 m 2       Fluids    Intake/Output Summary (Last 24 hours) at 3/13/2021 1040  Last data filed at 3/13/2021 0725  Gross per 24 hour   Intake 240 ml   Output 182 ml   Net 58 ml       Core Measures & Quality Metrics  Labs reviewed, Medications reviewed and Radiology images reviewed  Gonzalez catheter: No Gonzalez      DVT Prophylaxis: Enoxaparin (Lovenox)  DVT prophylaxis - mechanical: SCDs  Ulcer prophylaxis: Not indicated    Assessed for rehab:  Patient returned to prior level of function, rehabilitation not indicated at this time    RAP Score Total: 4    ETOH Screening  CAGE Score: 0  Assessment complete date: 3/12/2021    Drinks a 2-3 beers a night and more on the weekends.   Patient demonstrates understanding of intervention. Patient does not agree to follow-up.   has not been contacted. Follow up with: PCP, Clinic [facility]  Total ETOH intervention time: 15 - 30 mintues    Assessment/Plan  Multiple rib fractures involving four or more ribs- (present on admission)  Assessment & Plan  Acute fractures of right 2nd through 7th ribs.  Aggressive multimodal pain management, and pulmonary hygiene.    Serial chest radiographs.    Traumatic hemopneumothorax- (present on admission)  Assessment & Plan  Moderate right pneumothorax. Right hemothorax.  Tube thoracostomy placed in ED with follow up resolution of pneumothorax.  3/12 CXR imaging with trace right apical pneumothorax.  Chest tube with minimal output and no air leak. Chest tube to water seal this PM.  Aggressive pulmonary hygiene. Serial chest radiographs.    No contraindication to deep vein thrombosis (DVT) prophylaxis- (present on admission)  Assessment & Plan  Prophylactic dose enoxaparin initiated upon admission.     Screening examination for infectious disease- (present on admission)  Assessment & Plan  Admission SARS-CoV-2 testing negative. Repeat SARS-CoV-2 testing not indicated. Isolation precautions de-escalated.     Trauma- (present on admission)  Assessment & Plan  Fell two days prior to admission.  Trauma Green Activation.  Eleazar Harvey MD. Trauma Surgery.         Discussed patient condition with Patient and trauma surgery, Dr. Berry Styles.

## 2021-03-14 ENCOUNTER — APPOINTMENT (OUTPATIENT)
Dept: RADIOLOGY | Facility: MEDICAL CENTER | Age: 41
DRG: 200 | End: 2021-03-14
Attending: SURGERY
Payer: MEDICAID

## 2021-03-14 LAB
ANION GAP SERPL CALC-SCNC: 10 MMOL/L (ref 7–16)
BUN SERPL-MCNC: 14 MG/DL (ref 8–22)
CALCIUM SERPL-MCNC: 9 MG/DL (ref 8.5–10.5)
CHLORIDE SERPL-SCNC: 98 MMOL/L (ref 96–112)
CO2 SERPL-SCNC: 26 MMOL/L (ref 20–33)
CREAT SERPL-MCNC: 0.8 MG/DL (ref 0.5–1.4)
GLUCOSE SERPL-MCNC: 106 MG/DL (ref 65–99)
POTASSIUM SERPL-SCNC: 4.1 MMOL/L (ref 3.6–5.5)
SODIUM SERPL-SCNC: 134 MMOL/L (ref 135–145)

## 2021-03-14 PROCEDURE — 770006 HCHG ROOM/CARE - MED/SURG/GYN SEMI*

## 2021-03-14 PROCEDURE — 700111 HCHG RX REV CODE 636 W/ 250 OVERRIDE (IP): Performed by: SURGERY

## 2021-03-14 PROCEDURE — 94760 N-INVAS EAR/PLS OXIMETRY 1: CPT

## 2021-03-14 PROCEDURE — 700101 HCHG RX REV CODE 250: Performed by: NURSE PRACTITIONER

## 2021-03-14 PROCEDURE — A9270 NON-COVERED ITEM OR SERVICE: HCPCS | Performed by: SURGERY

## 2021-03-14 PROCEDURE — 71045 X-RAY EXAM CHEST 1 VIEW: CPT

## 2021-03-14 PROCEDURE — 94669 MECHANICAL CHEST WALL OSCILL: CPT

## 2021-03-14 PROCEDURE — 700102 HCHG RX REV CODE 250 W/ 637 OVERRIDE(OP): Performed by: SURGERY

## 2021-03-14 PROCEDURE — 80048 BASIC METABOLIC PNL TOTAL CA: CPT

## 2021-03-14 PROCEDURE — 36415 COLL VENOUS BLD VENIPUNCTURE: CPT

## 2021-03-14 RX ADMIN — LIDOCAINE 1 PATCH: 50 PATCH TOPICAL at 19:56

## 2021-03-14 RX ADMIN — IBUPROFEN 800 MG: 600 TABLET ORAL at 05:46

## 2021-03-14 RX ADMIN — ACETAMINOPHEN 1000 MG: 500 TABLET, FILM COATED ORAL at 13:46

## 2021-03-14 RX ADMIN — IBUPROFEN 800 MG: 600 TABLET ORAL at 13:46

## 2021-03-14 RX ADMIN — ACETAMINOPHEN 1000 MG: 500 TABLET, FILM COATED ORAL at 22:57

## 2021-03-14 RX ADMIN — ACETAMINOPHEN 1000 MG: 500 TABLET, FILM COATED ORAL at 17:33

## 2021-03-14 RX ADMIN — ENOXAPARIN SODIUM 30 MG: 30 INJECTION SUBCUTANEOUS at 05:46

## 2021-03-14 RX ADMIN — DOCUSATE SODIUM 100 MG: 100 CAPSULE, LIQUID FILLED ORAL at 17:33

## 2021-03-14 RX ADMIN — IBUPROFEN 800 MG: 600 TABLET ORAL at 17:33

## 2021-03-14 RX ADMIN — ENOXAPARIN SODIUM 30 MG: 30 INJECTION SUBCUTANEOUS at 17:33

## 2021-03-14 RX ADMIN — ACETAMINOPHEN 1000 MG: 500 TABLET, FILM COATED ORAL at 05:46

## 2021-03-14 ASSESSMENT — ENCOUNTER SYMPTOMS
SPEECH CHANGE: 0
VOMITING: 0
NAUSEA: 0
FEVER: 0
MYALGIAS: 1
SHORTNESS OF BREATH: 0
CONSTIPATION: 0
BLURRED VISION: 0
FOCAL WEAKNESS: 0
CHILLS: 0
SENSORY CHANGE: 0
DOUBLE VISION: 0
ABDOMINAL PAIN: 0

## 2021-03-14 ASSESSMENT — PAIN DESCRIPTION - PAIN TYPE
TYPE: ACUTE PAIN
TYPE: ACUTE PAIN

## 2021-03-14 ASSESSMENT — LIFESTYLE VARIABLES: SUBSTANCE_ABUSE: 1

## 2021-03-14 NOTE — PROGRESS NOTES
Trauma / Surgical Daily Progress Note    Date of Service  3/14/2021    Chief Complaint  40 y.o. male admitted 3/11/2021 with right pneumothorax with rib fractures    Interval Events    CXR with no appreciable right pneumothorax.     - Chest tube to water seal.   - Disposition: home when medically cleared   - Counseled     Review of Systems  Review of Systems   Constitutional: Negative for chills and fever.   HENT: Negative for hearing loss.    Eyes: Negative for blurred vision and double vision.   Respiratory: Negative for shortness of breath.    Cardiovascular: Negative for chest pain.   Gastrointestinal: Negative for abdominal pain, constipation (3/13 (+) BM), nausea and vomiting.   Genitourinary: Negative for dysuria.   Musculoskeletal: Positive for myalgias (chest tube insertion site / rib fractures).   Skin: Negative for rash.   Neurological: Negative for sensory change, speech change and focal weakness.   Psychiatric/Behavioral: Positive for substance abuse.        Vital Signs  Temp:  [35.8 °C (96.5 °F)-36.9 °C (98.4 °F)] 36.8 °C (98.3 °F)  Pulse:  [68-99] 74  Resp:  [12-20] 18  BP: ()/(63-79) 97/63  SpO2:  [92 %-98 %] 97 %    Physical Exam  Physical Exam  Vitals and nursing note reviewed.   Constitutional:       General: He is awake. He is not in acute distress.     Appearance: Normal appearance. He is not ill-appearing or toxic-appearing.   HENT:      Head: Normocephalic.      Nose: Nose normal.      Mouth/Throat:      Mouth: Mucous membranes are moist.      Pharynx: Oropharynx is clear.   Eyes:      Extraocular Movements: Extraocular movements intact.      Conjunctiva/sclera: Conjunctivae normal.      Pupils: Pupils are equal, round, and reactive to light.   Cardiovascular:      Rate and Rhythm: Normal rate and regular rhythm.      Pulses: Normal pulses.      Heart sounds: Normal heart sounds.   Pulmonary:      Effort: Pulmonary effort is normal. No respiratory distress.      Breath sounds: Normal  breath sounds.      Comments: Chest tube to suction with no air leak.   Chest:      Chest wall: Tenderness present.   Abdominal:      General: There is no distension.      Tenderness: There is no abdominal tenderness. There is no guarding or rebound.   Musculoskeletal:         General: No swelling, tenderness or deformity. Normal range of motion.      Cervical back: Normal range of motion.   Skin:     General: Skin is warm and dry.      Capillary Refill: Capillary refill takes less than 2 seconds.   Neurological:      General: No focal deficit present.      Mental Status: He is alert.      GCS: GCS eye subscore is 4. GCS verbal subscore is 5. GCS motor subscore is 6.   Psychiatric:         Mood and Affect: Mood normal.         Behavior: Behavior normal. Behavior is cooperative.         Laboratory  Recent Results (from the past 24 hour(s))   Basic Metabolic Panel (BMP): Tomorrow AM    Collection Time: 03/14/21  4:26 AM   Result Value Ref Range    Sodium 134 (L) 135 - 145 mmol/L    Potassium 4.1 3.6 - 5.5 mmol/L    Chloride 98 96 - 112 mmol/L    Co2 26 20 - 33 mmol/L    Glucose 106 (H) 65 - 99 mg/dL    Bun 14 8 - 22 mg/dL    Creatinine 0.80 0.50 - 1.40 mg/dL    Calcium 9.0 8.5 - 10.5 mg/dL    Anion Gap 10.0 7.0 - 16.0   ESTIMATED GFR    Collection Time: 03/14/21  4:26 AM   Result Value Ref Range    GFR If African American >60 >60 mL/min/1.73 m 2    GFR If Non African American >60 >60 mL/min/1.73 m 2       Fluids    Intake/Output Summary (Last 24 hours) at 3/14/2021 1041  Last data filed at 3/14/2021 0722  Gross per 24 hour   Intake 360 ml   Output 90 ml   Net 270 ml       Core Measures & Quality Metrics  Labs reviewed, Medications reviewed and Radiology images reviewed  Gonzalez catheter: No Gonzalez      DVT Prophylaxis: Enoxaparin (Lovenox)  DVT prophylaxis - mechanical: SCDs  Ulcer prophylaxis: Not indicated    Assessed for rehab: Patient returned to prior level of function, rehabilitation not indicated at this  time    RAP Score Total: 4    ETOH Screening  CAGE Score: 0  Assessment complete date: 3/12/2021        Assessment/Plan  Multiple rib fractures involving four or more ribs- (present on admission)  Assessment & Plan  Acute fractures of right 2nd through 7th ribs.  Aggressive multimodal pain management, and pulmonary hygiene.    Serial chest radiographs.     Traumatic hemopneumothorax- (present on admission)  Assessment & Plan  Moderate right pneumothorax. Right hemothorax.  Tube thoracostomy placed in ED with follow up resolution of pneumothorax.  3/12 Chest tube to water   3/13 Chest tube returned to suction for slightly worse mild to moderate right apical pneumothorax.  3/14 CXR with no appreciable right pneumothorax. Chest tube to water seal.  Total in pleura vac 270 cc's.  Aggressive pulmonary hygiene. Serial chest radiographs.    No contraindication to deep vein thrombosis (DVT) prophylaxis- (present on admission)  Assessment & Plan  Prophylactic dose enoxaparin initiated upon admission.      Screening examination for infectious disease- (present on admission)  Assessment & Plan  Admission SARS-CoV-2 testing negative. Repeat SARS-CoV-2 testing not indicated. Isolation precautions de-escalated.      Trauma- (present on admission)  Assessment & Plan  Fell two days prior to admission.  Trauma Green Activation.  Eleazar Harvey MD. Trauma Surgery.          Discussed patient condition with Patient and trauma surgery, Dr. Berry Styles.

## 2021-03-14 NOTE — PROGRESS NOTES
Received report from day shift RN. Assumed care of patient at 1930  Assessment complete. VSS  A+Ox4, patient calls appropriately.   Patient reports 4/10 pain to right back, medicated per MAR.   Patient ambulates up self  Denies N/V, tolerating a regular diet.   + flatus  + BM  + void  Patient on RA, denies SOB  SCDs on. Denies numbness and tingling.   Patient is resting comfortably in bed. Reviewed plan of care. Call light and personal belongings in reach. Bed locked and in lowest position. Hourly rounding in place. All needs met at this time.

## 2021-03-15 ENCOUNTER — APPOINTMENT (OUTPATIENT)
Dept: RADIOLOGY | Facility: MEDICAL CENTER | Age: 41
DRG: 200 | End: 2021-03-15
Attending: SURGERY
Payer: MEDICAID

## 2021-03-15 PROCEDURE — 770006 HCHG ROOM/CARE - MED/SURG/GYN SEMI*

## 2021-03-15 PROCEDURE — 94760 N-INVAS EAR/PLS OXIMETRY 1: CPT

## 2021-03-15 PROCEDURE — A9270 NON-COVERED ITEM OR SERVICE: HCPCS | Performed by: SURGERY

## 2021-03-15 PROCEDURE — 700102 HCHG RX REV CODE 250 W/ 637 OVERRIDE(OP): Performed by: SURGERY

## 2021-03-15 PROCEDURE — 700101 HCHG RX REV CODE 250: Performed by: NURSE PRACTITIONER

## 2021-03-15 PROCEDURE — 700111 HCHG RX REV CODE 636 W/ 250 OVERRIDE (IP): Performed by: SURGERY

## 2021-03-15 PROCEDURE — 71045 X-RAY EXAM CHEST 1 VIEW: CPT

## 2021-03-15 PROCEDURE — 94669 MECHANICAL CHEST WALL OSCILL: CPT

## 2021-03-15 RX ADMIN — IBUPROFEN 800 MG: 600 TABLET ORAL at 17:11

## 2021-03-15 RX ADMIN — DOCUSATE SODIUM 100 MG: 100 CAPSULE, LIQUID FILLED ORAL at 17:11

## 2021-03-15 RX ADMIN — OXYCODONE 5 MG: 5 TABLET ORAL at 09:40

## 2021-03-15 RX ADMIN — IBUPROFEN 800 MG: 600 TABLET ORAL at 05:05

## 2021-03-15 RX ADMIN — LIDOCAINE 1 PATCH: 50 PATCH TOPICAL at 19:54

## 2021-03-15 RX ADMIN — DOCUSATE SODIUM 100 MG: 100 CAPSULE, LIQUID FILLED ORAL at 05:05

## 2021-03-15 RX ADMIN — ACETAMINOPHEN 1000 MG: 500 TABLET, FILM COATED ORAL at 17:11

## 2021-03-15 RX ADMIN — IBUPROFEN 800 MG: 600 TABLET ORAL at 12:06

## 2021-03-15 RX ADMIN — ENOXAPARIN SODIUM 30 MG: 30 INJECTION SUBCUTANEOUS at 17:11

## 2021-03-15 RX ADMIN — ACETAMINOPHEN 1000 MG: 500 TABLET, FILM COATED ORAL at 23:18

## 2021-03-15 RX ADMIN — ACETAMINOPHEN 1000 MG: 500 TABLET, FILM COATED ORAL at 05:05

## 2021-03-15 RX ADMIN — ENOXAPARIN SODIUM 30 MG: 30 INJECTION SUBCUTANEOUS at 05:05

## 2021-03-15 ASSESSMENT — PAIN DESCRIPTION - PAIN TYPE
TYPE: ACUTE PAIN

## 2021-03-15 ASSESSMENT — ENCOUNTER SYMPTOMS
FOCAL WEAKNESS: 0
SHORTNESS OF BREATH: 0
VOMITING: 0
CONSTIPATION: 0
NAUSEA: 0
SENSORY CHANGE: 0
SPEECH CHANGE: 0
MYALGIAS: 1
BLURRED VISION: 0
CHILLS: 0
FEVER: 0
ABDOMINAL PAIN: 0
DOUBLE VISION: 0

## 2021-03-15 ASSESSMENT — LIFESTYLE VARIABLES: SUBSTANCE_ABUSE: 1

## 2021-03-15 NOTE — FLOWSHEET NOTE
Conscious Sedation Respiratory Update    FiO2%: 21 % (03/13/21 1935)  O2 (LPM): 0 (03/14/21 1715)       Events/Summary/Plan: Flutter and IS done with good effort. (03/14/21 1715)

## 2021-03-15 NOTE — PROGRESS NOTES
Received report from day shift RN. Assumed care of patient at 1930  Assessment complete. VSS  A+Ox4, patient calls appropriately.   Patient reports 4/10 pain to right back, managed with scheduled medication   Patient ambulates up self  Denies N/V, tolerating a regular diet.   + flatus  + BM  + void  Patient on RA, denies SOB  Denies numbness and tingling.   Patient is resting comfortably in bed. Reviewed plan of care. Call light and personal belongings in reach. Bed locked and in lowest position. Hourly rounding in place. All needs met at this time.

## 2021-03-16 ENCOUNTER — APPOINTMENT (OUTPATIENT)
Dept: RADIOLOGY | Facility: MEDICAL CENTER | Age: 41
DRG: 200 | End: 2021-03-16
Attending: SURGERY
Payer: MEDICAID

## 2021-03-16 PROCEDURE — 71045 X-RAY EXAM CHEST 1 VIEW: CPT

## 2021-03-16 PROCEDURE — A9270 NON-COVERED ITEM OR SERVICE: HCPCS | Performed by: NURSE PRACTITIONER

## 2021-03-16 PROCEDURE — 700101 HCHG RX REV CODE 250: Performed by: NURSE PRACTITIONER

## 2021-03-16 PROCEDURE — 770006 HCHG ROOM/CARE - MED/SURG/GYN SEMI*

## 2021-03-16 PROCEDURE — 700102 HCHG RX REV CODE 250 W/ 637 OVERRIDE(OP): Performed by: SURGERY

## 2021-03-16 PROCEDURE — A9270 NON-COVERED ITEM OR SERVICE: HCPCS | Performed by: SURGERY

## 2021-03-16 PROCEDURE — 700102 HCHG RX REV CODE 250 W/ 637 OVERRIDE(OP): Performed by: NURSE PRACTITIONER

## 2021-03-16 PROCEDURE — 94760 N-INVAS EAR/PLS OXIMETRY 1: CPT

## 2021-03-16 PROCEDURE — 94669 MECHANICAL CHEST WALL OSCILL: CPT

## 2021-03-16 RX ORDER — DIPHENHYDRAMINE HCL 25 MG
25 TABLET ORAL EVERY 4 HOURS PRN
Status: DISCONTINUED | OUTPATIENT
Start: 2021-03-16 | End: 2021-03-19 | Stop reason: HOSPADM

## 2021-03-16 RX ADMIN — DOCUSATE SODIUM 100 MG: 100 CAPSULE, LIQUID FILLED ORAL at 05:46

## 2021-03-16 RX ADMIN — ACETAMINOPHEN 1000 MG: 500 TABLET, FILM COATED ORAL at 12:08

## 2021-03-16 RX ADMIN — DIPHENHYDRAMINE HYDROCHLORIDE 25 MG: 25 TABLET ORAL at 22:38

## 2021-03-16 RX ADMIN — ACETAMINOPHEN 1000 MG: 500 TABLET, FILM COATED ORAL at 05:46

## 2021-03-16 RX ADMIN — LIDOCAINE 1 PATCH: 50 PATCH TOPICAL at 21:41

## 2021-03-16 RX ADMIN — IBUPROFEN 800 MG: 800 TABLET, FILM COATED ORAL at 21:41

## 2021-03-16 RX ADMIN — IBUPROFEN 800 MG: 600 TABLET ORAL at 05:46

## 2021-03-16 RX ADMIN — ACETAMINOPHEN 1000 MG: 500 TABLET, FILM COATED ORAL at 21:41

## 2021-03-16 RX ADMIN — IBUPROFEN 800 MG: 600 TABLET ORAL at 12:08

## 2021-03-16 ASSESSMENT — PAIN DESCRIPTION - PAIN TYPE
TYPE: ACUTE PAIN

## 2021-03-16 ASSESSMENT — ENCOUNTER SYMPTOMS
CONSTIPATION: 0
DOUBLE VISION: 0
ABDOMINAL PAIN: 0
FOCAL WEAKNESS: 0
CHILLS: 0
SHORTNESS OF BREATH: 0
SPEECH CHANGE: 0
MYALGIAS: 1
VOMITING: 0
SENSORY CHANGE: 0
NAUSEA: 0
FEVER: 0
BLURRED VISION: 0

## 2021-03-16 ASSESSMENT — LIFESTYLE VARIABLES: SUBSTANCE_ABUSE: 1

## 2021-03-16 NOTE — PROGRESS NOTES
"Discussed plan of care with patient regarding another 24 hours + on suction for chest tube and then reassess on water seal.  Pt expressed understanding of plan and agrees.  Pt denies pain at this time, no shortness of breath.  States \" I just wish I knew what happened, I don't remember falling and there was no bruising or anything like I thought there should be with 4 broken ribs\".  Pt independent in ADLs.  "

## 2021-03-16 NOTE — PROGRESS NOTES
Received report from day shift RN. Assumed care of patient at 1930  Assessment complete. VSS  A+Ox4, patient calls appropriately.   Patient declines pain  Patient ambulates up self  Denies N/V, tolerating a regular diet.   + flatus  + BM  + void   Patient on RA, denies SOB  Denies numbness and tingling.   Patient is resting comfortably in bed. Reviewed plan of care. Call light and personal belongings in reach. Bed locked and in lowest position. Hourly rounding in place. All needs met at this time.

## 2021-03-16 NOTE — PROGRESS NOTES
Trauma / Surgical Daily Progress Note    Date of Service  3/16/2021    Chief Complaint  40 y.o. male admitted 3/11/2021 with Right pneumothorax with rib fractures  Post procedure day # 5 right tube thoracostomy    Interval Events    No critical events overnight   No overt changes in clinical exam.   Clinically stable.  Chest tube to suction with no pneumothorax on AM CXR.    - Failed attempts x 2 to transition CT to water seal with the development of recurrent pneumothorax.  - Continue chest tube to suction.    - Transition CT to water seal tomorrow, 48 hrs after last attempt, if no pneumothorax on CXR imaging.    - Disposition: home when medically cleared  - Counseled     Review of Systems  Review of Systems   Constitutional: Negative for chills and fever.   HENT: Negative for hearing loss.    Eyes: Negative for blurred vision and double vision.   Respiratory: Negative for shortness of breath.    Cardiovascular: Negative for chest pain.   Gastrointestinal: Negative for abdominal pain, constipation (3/15 (+) BM), nausea and vomiting.   Genitourinary: Negative for dysuria.   Musculoskeletal: Positive for myalgias (chest tube insertion site / rib fractures).   Skin: Negative for rash.   Neurological: Negative for sensory change, speech change and focal weakness.   Psychiatric/Behavioral: Positive for substance abuse.        Vital Signs  Temp:  [35.9 °C (96.6 °F)-36.4 °C (97.5 °F)] 35.9 °C (96.6 °F)  Pulse:  [65-88] 84  Resp:  [16-18] 16  BP: (101-111)/(63-72) 105/67  SpO2:  [93 %-98 %] 95 %    Physical Exam  Physical Exam  Vitals and nursing note reviewed.   Constitutional:       General: He is awake. He is not in acute distress.     Appearance: Normal appearance. He is not ill-appearing or toxic-appearing.   HENT:      Head: Normocephalic.      Nose: Nose normal.      Mouth/Throat:      Mouth: Mucous membranes are moist.      Pharynx: Oropharynx is clear.   Eyes:      Extraocular Movements: Extraocular movements  intact.      Conjunctiva/sclera: Conjunctivae normal.      Pupils: Pupils are equal, round, and reactive to light.   Cardiovascular:      Rate and Rhythm: Normal rate and regular rhythm.      Pulses: Normal pulses.      Heart sounds: Normal heart sounds.   Pulmonary:      Effort: Pulmonary effort is normal. No respiratory distress.      Breath sounds: Normal breath sounds.      Comments: Chest tube to suction with no air leak.   Chest:      Chest wall: Tenderness present.   Abdominal:      General: There is no distension.      Tenderness: There is no abdominal tenderness. There is no guarding or rebound.   Musculoskeletal:         General: No swelling, tenderness or deformity. Normal range of motion.      Cervical back: Normal range of motion.   Skin:     General: Skin is warm and dry.      Capillary Refill: Capillary refill takes less than 2 seconds.   Neurological:      General: No focal deficit present.      Mental Status: He is alert.      GCS: GCS eye subscore is 4. GCS verbal subscore is 5. GCS motor subscore is 6.   Psychiatric:         Mood and Affect: Mood normal.         Behavior: Behavior normal. Behavior is cooperative.         Laboratory  No results found for this or any previous visit (from the past 24 hour(s)).    Fluids    Intake/Output Summary (Last 24 hours) at 3/16/2021 1057  Last data filed at 3/16/2021 0746  Gross per 24 hour   Intake 840 ml   Output 120 ml   Net 720 ml       Core Measures & Quality Metrics  Labs reviewed, Medications reviewed and Radiology images reviewed  Gonzalez catheter: No Gonzalez      DVT Prophylaxis: Enoxaparin (Lovenox)  DVT prophylaxis - mechanical: SCDs  Ulcer prophylaxis: Not indicated    Assessed for rehab: Patient returned to prior level of function, rehabilitation not indicated at this time    RAP Score Total: 4    ETOH Screening  CAGE Score: 0  Assessment complete date: 3/12/2021        Assessment/Plan  Multiple rib fractures involving four or more ribs- (present on  admission)  Assessment & Plan  Acute fractures of right 2nd through 7th ribs.  Aggressive multimodal pain management, and pulmonary hygiene.    Serial chest radiographs.       Traumatic hemopneumothorax- (present on admission)  Assessment & Plan  Moderate right pneumothorax. Right hemothorax.  Tube thoracostomy placed in ED with follow up resolution of pneumothorax.  3/12 Chest tube to water   3/13 Chest tube returned to suction for slightly worse mild to moderate right apical pneumothorax.  3/14 CXR with no appreciable right pneumothorax. Chest tube to water seal.   3/15 Chest tube returned to suction for new small right pneumothorax.   3/16 No pneumothorax on AM CXR.. Continue chest tube to suction.  Total in pleura vac 530 cc. 24 hr output 110 cc.   3/17 Place chest tube to suction if no pneumothorax.    Aggressive pulmonary hygiene. Serial chest radiographs.    No contraindication to deep vein thrombosis (DVT) prophylaxis- (present on admission)  Assessment & Plan  Prophylactic dose enoxaparin initiated upon admission.      Screening examination for infectious disease- (present on admission)  Assessment & Plan  Admission SARS-CoV-2 testing negative. Repeat SARS-CoV-2 testing not indicated. Isolation precautions de-escalated.     Trauma- (present on admission)  Assessment & Plan  Fell two days prior to admission.  Trauma Green Activation.  Eleazar Harvey MD. Trauma Surgery.       Discussed patient condition with Patient and trauma surgery, Dr. Eleazar Harvey.

## 2021-03-17 ENCOUNTER — APPOINTMENT (OUTPATIENT)
Dept: RADIOLOGY | Facility: MEDICAL CENTER | Age: 41
DRG: 200 | End: 2021-03-17
Attending: SURGERY
Payer: MEDICAID

## 2021-03-17 PROCEDURE — 700102 HCHG RX REV CODE 250 W/ 637 OVERRIDE(OP): Performed by: SURGERY

## 2021-03-17 PROCEDURE — A9270 NON-COVERED ITEM OR SERVICE: HCPCS | Performed by: SURGERY

## 2021-03-17 PROCEDURE — 71045 X-RAY EXAM CHEST 1 VIEW: CPT

## 2021-03-17 PROCEDURE — 700101 HCHG RX REV CODE 250: Performed by: SURGERY

## 2021-03-17 PROCEDURE — A9270 NON-COVERED ITEM OR SERVICE: HCPCS | Performed by: NURSE PRACTITIONER

## 2021-03-17 PROCEDURE — 94669 MECHANICAL CHEST WALL OSCILL: CPT

## 2021-03-17 PROCEDURE — 94760 N-INVAS EAR/PLS OXIMETRY 1: CPT

## 2021-03-17 PROCEDURE — 700102 HCHG RX REV CODE 250 W/ 637 OVERRIDE(OP): Performed by: NURSE PRACTITIONER

## 2021-03-17 PROCEDURE — 302196 LINEN, HYPOALLERGENIC: Performed by: SURGERY

## 2021-03-17 PROCEDURE — 770006 HCHG ROOM/CARE - MED/SURG/GYN SEMI*

## 2021-03-17 PROCEDURE — 700111 HCHG RX REV CODE 636 W/ 250 OVERRIDE (IP): Performed by: SURGERY

## 2021-03-17 RX ADMIN — ACETAMINOPHEN 1000 MG: 500 TABLET, FILM COATED ORAL at 04:13

## 2021-03-17 RX ADMIN — IBUPROFEN 800 MG: 800 TABLET, FILM COATED ORAL at 21:10

## 2021-03-17 RX ADMIN — ACETAMINOPHEN 1000 MG: 500 TABLET, FILM COATED ORAL at 13:50

## 2021-03-17 RX ADMIN — DOCUSATE SODIUM 100 MG: 100 CAPSULE, LIQUID FILLED ORAL at 18:13

## 2021-03-17 RX ADMIN — DIPHENHYDRAMINE HYDROCHLORIDE 25 MG: 25 TABLET ORAL at 21:10

## 2021-03-17 RX ADMIN — DIPHENHYDRAMINE HYDROCHLORIDE 25 MG: 25 TABLET ORAL at 15:53

## 2021-03-17 RX ADMIN — DIPHENHYDRAMINE HYDROCHLORIDE 25 MG: 25 TABLET ORAL at 04:13

## 2021-03-17 RX ADMIN — ENOXAPARIN SODIUM 30 MG: 30 INJECTION SUBCUTANEOUS at 18:13

## 2021-03-17 RX ADMIN — DOCUSATE SODIUM 100 MG: 100 CAPSULE, LIQUID FILLED ORAL at 05:47

## 2021-03-17 RX ADMIN — POLYETHYLENE GLYCOL 3350 1 PACKET: 17 POWDER, FOR SOLUTION ORAL at 05:47

## 2021-03-17 ASSESSMENT — ENCOUNTER SYMPTOMS
NECK PAIN: 0
CHILLS: 0
COUGH: 0
VOMITING: 0
PALPITATIONS: 0
NAUSEA: 0
MYALGIAS: 1
BACK PAIN: 0
TINGLING: 0
FEVER: 0
FOCAL WEAKNESS: 0
ABDOMINAL PAIN: 0
SHORTNESS OF BREATH: 0
SENSORY CHANGE: 0
DOUBLE VISION: 0
ROS GI COMMENTS: 3/16 BM

## 2021-03-17 ASSESSMENT — PAIN DESCRIPTION - PAIN TYPE
TYPE: ACUTE PAIN

## 2021-03-17 NOTE — PROGRESS NOTES
Pt requesting to take medications around 20:00 this evening before bed.  Did not want the Miralax or Lovenox at this time.

## 2021-03-17 NOTE — CARE PLAN
Problem: Hyperinflation:  Goal: Prevent or improve atelectasis  Outcome: PROGRESSING AS EXPECTED       Respiratory Update    Treatment modality: PEP  Frequency: QID, did not de-escalate due to patient still having chest tube    Pt tolerating current treatments well with no adverse reactions.

## 2021-03-17 NOTE — PROGRESS NOTES
Trauma / Surgical Daily Progress Note    Date of Service  3/17/2021    Chief Complaint  40 y.o. male admitted 3/11/2021 with Right pneumothorax with rib fractures    Interval Events  Chest x-ray without pneumothorax   Complaining of rash to back    - Chest tube to water seal  - Continue aggressive pulmonary hygiene  - Benadryl on MAR for rash, hypo allergic sheets available     Review of Systems  Review of Systems   Constitutional: Negative for chills and fever.   Eyes: Negative for double vision.   Respiratory: Negative for cough and shortness of breath.    Cardiovascular: Negative for palpitations.   Gastrointestinal: Negative for abdominal pain, nausea and vomiting.        3/16 BM   Genitourinary:        Voiding   Musculoskeletal: Positive for myalgias (chest wall). Negative for back pain, joint pain and neck pain.   Skin: Positive for rash.   Neurological: Negative for tingling, sensory change and focal weakness.        Vital Signs  Temp:  [36.1 °C (97 °F)-36.5 °C (97.7 °F)] 36.4 °C (97.6 °F)  Pulse:  [73-89] 74  Resp:  [16-18] 18  BP: (101-122)/(51-70) 105/51  SpO2:  [93 %-99 %] 94 %    Physical Exam  Physical Exam  Vitals and nursing note reviewed.   Constitutional:       General: He is awake. He is not in acute distress.     Appearance: Normal appearance. He is not ill-appearing or toxic-appearing.   HENT:      Head: Normocephalic.      Nose: Nose normal.      Mouth/Throat:      Mouth: Mucous membranes are moist.      Pharynx: Oropharynx is clear.   Eyes:      Extraocular Movements: Extraocular movements intact.      Conjunctiva/sclera: Conjunctivae normal.   Cardiovascular:      Rate and Rhythm: Normal rate and regular rhythm.      Pulses: Normal pulses.      Heart sounds: Normal heart sounds.   Pulmonary:      Effort: Pulmonary effort is normal. No respiratory distress.      Breath sounds: Normal breath sounds.      Comments: Right chest tube in place, no air leak  Chest:      Chest wall: Tenderness  present.   Abdominal:      General: There is no distension.      Tenderness: There is no abdominal tenderness.   Musculoskeletal:         General: No tenderness.      Cervical back: Neck supple. No tenderness.   Skin:     General: Skin is warm and dry.      Capillary Refill: Capillary refill takes less than 2 seconds.      Comments: Maculopapular rash noted to back, minimally noted near chest tube dressing on right side- not noted on legs or arms   Neurological:      General: No focal deficit present.      Mental Status: He is alert.      GCS: GCS eye subscore is 4. GCS verbal subscore is 5. GCS motor subscore is 6.   Psychiatric:         Behavior: Behavior is cooperative.         Laboratory  No results found for this or any previous visit (from the past 24 hour(s)).    Fluids    Intake/Output Summary (Last 24 hours) at 3/17/2021 0854  Last data filed at 3/16/2021 2000  Gross per 24 hour   Intake 600 ml   Output 150 ml   Net 450 ml       Core Measures & Quality Metrics  Labs reviewed, Medications reviewed and Radiology images reviewed  Gonzalez catheter: No Gonzalez      DVT Prophylaxis: Enoxaparin (Lovenox)  DVT prophylaxis - mechanical: SCDs  Ulcer prophylaxis: Not indicated    Assessed for rehab: Patient was assess for and/or received rehabilitation services during this hospitalization    RAP Score Total: 4    ETOH Screening  CAGE Score: 0  Assessment complete date: 3/12/2021        Assessment/Plan  Multiple rib fractures involving four or more ribs- (present on admission)  Assessment & Plan  Acute fractures of right 2nd through 7th ribs.  Aggressive multimodal pain management, and pulmonary hygiene.    Serial chest radiographs.    Traumatic hemopneumothorax- (present on admission)  Assessment & Plan  Moderate right pneumothorax. Right hemothorax.  Tube thoracostomy placed in ED with follow up resolution of pneumothorax.  3/12 Chest tube to water seal  3/13 Chest tube returned to suction for slightly worse mild to  moderate right apical pneumothorax.  3/14 CXR with no appreciable right pneumothorax. Chest tube to water seal.   3/15 Chest tube returned to suction for new small right pneumothorax.   3/16 No pneumothorax on AM chest x-ray. Continue chest tube to suction.   3/17 Chest x-ray without pneumothorax, chest tube to water seal   - Total in pleura vac 650ml / 120ml 24 hour output  Aggressive pulmonary hygiene. Serial chest radiographs.    No contraindication to deep vein thrombosis (DVT) prophylaxis- (present on admission)  Assessment & Plan  Prophylactic dose enoxaparin initiated upon admission.      Screening examination for infectious disease- (present on admission)  Assessment & Plan  Admission SARS-CoV-2 testing negative. Repeat SARS-CoV-2 testing not indicated. Isolation precautions de-escalated.     Trauma- (present on admission)  Assessment & Plan  Fell two days prior to admission.  Trauma Green Activation.  Eleazar Harvey MD. Trauma Surgery.         Discussed patient condition with RN, Patient and trauma surgery, Dr. Harvey.

## 2021-03-17 NOTE — CARE PLAN
Problem: Safety  Goal: Will remain free from injury  Outcome: PROGRESSING AS EXPECTED  Pt understands safety measures, up in room independently.     Problem: Knowledge Deficit  Goal: Knowledge of disease process/condition, treatment plan, diagnostic tests, and medications will improve  Outcome: PROGRESSING AS EXPECTED  Pt understands plan of care regarding chest tube to suction, water seal tomorrow and assess response prior to discharge.     Problem: Respiratory:  Goal: Respiratory status will improve  Outcome: PROGRESSING AS EXPECTED  Pt denies shortness of breath or difficulty breathing.     Problem: Pain Management  Goal: Pain level will decrease to patient's comfort goal  Outcome: PROGRESSING AS EXPECTED  Pt has good pain control on scheduled medications.  Discussed that he will have to ask for pain medications if needed now.

## 2021-03-17 NOTE — PROGRESS NOTES
Report received.  Assessment complete.  A&O x 4. Patient calls appropriately.  Patient ambulates with no assistance.   Patient has 4/10 pain. Pain managed with prescribed medications.   Denies N&V. Tolerating regular diet.  + void, + flatus, last BM 3/16.  Patient denies SOB.  Patient laying in bed. Right chest tube to -20 cm H20 suction. No leaks noted. Dressing CDI.   Red rash noted to back and abdomen. Patient given Benadryl overnight. States it was not overly effective. Hypoallergenic sheets ordered.     Review plan with of care with patient. Call light and personal belongings with in reach. Hourly rounding in place. All needs met at this time.

## 2021-03-18 ENCOUNTER — APPOINTMENT (OUTPATIENT)
Dept: RADIOLOGY | Facility: MEDICAL CENTER | Age: 41
DRG: 200 | End: 2021-03-18
Attending: SURGERY
Payer: MEDICAID

## 2021-03-18 PROCEDURE — 700102 HCHG RX REV CODE 250 W/ 637 OVERRIDE(OP): Performed by: NURSE PRACTITIONER

## 2021-03-18 PROCEDURE — A9270 NON-COVERED ITEM OR SERVICE: HCPCS | Performed by: SURGERY

## 2021-03-18 PROCEDURE — 700111 HCHG RX REV CODE 636 W/ 250 OVERRIDE (IP): Performed by: SURGERY

## 2021-03-18 PROCEDURE — A9270 NON-COVERED ITEM OR SERVICE: HCPCS | Performed by: NURSE PRACTITIONER

## 2021-03-18 PROCEDURE — 700102 HCHG RX REV CODE 250 W/ 637 OVERRIDE(OP): Performed by: SURGERY

## 2021-03-18 PROCEDURE — 770006 HCHG ROOM/CARE - MED/SURG/GYN SEMI*

## 2021-03-18 PROCEDURE — 71045 X-RAY EXAM CHEST 1 VIEW: CPT

## 2021-03-18 RX ADMIN — DOCUSATE SODIUM 100 MG: 100 CAPSULE, LIQUID FILLED ORAL at 17:22

## 2021-03-18 RX ADMIN — DOCUSATE SODIUM 100 MG: 100 CAPSULE, LIQUID FILLED ORAL at 06:35

## 2021-03-18 RX ADMIN — DIPHENHYDRAMINE HYDROCHLORIDE 25 MG: 25 TABLET ORAL at 06:35

## 2021-03-18 RX ADMIN — ACETAMINOPHEN 1000 MG: 500 TABLET, FILM COATED ORAL at 06:35

## 2021-03-18 RX ADMIN — ENOXAPARIN SODIUM 30 MG: 30 INJECTION SUBCUTANEOUS at 17:20

## 2021-03-18 RX ADMIN — IBUPROFEN 800 MG: 800 TABLET, FILM COATED ORAL at 21:27

## 2021-03-18 RX ADMIN — ENOXAPARIN SODIUM 30 MG: 30 INJECTION SUBCUTANEOUS at 06:35

## 2021-03-18 RX ADMIN — IBUPROFEN 800 MG: 800 TABLET, FILM COATED ORAL at 16:29

## 2021-03-18 RX ADMIN — DIPHENHYDRAMINE HYDROCHLORIDE 25 MG: 25 TABLET ORAL at 17:24

## 2021-03-18 ASSESSMENT — ENCOUNTER SYMPTOMS
MYALGIAS: 1
TINGLING: 0
CHILLS: 0
COUGH: 0
VOMITING: 0
PALPITATIONS: 0
NECK PAIN: 0
NAUSEA: 0
ABDOMINAL PAIN: 0
FOCAL WEAKNESS: 0
FEVER: 0
DOUBLE VISION: 0
SHORTNESS OF BREATH: 0
SENSORY CHANGE: 0
ROS GI COMMENTS: 3/17 BM
BACK PAIN: 0

## 2021-03-18 ASSESSMENT — PAIN DESCRIPTION - PAIN TYPE
TYPE: ACUTE PAIN

## 2021-03-18 NOTE — CARE PLAN
Problem: Safety  Goal: Will remain free from injury  Outcome: PROGRESSING AS EXPECTED  Goal: Will remain free from falls  Outcome: PROGRESSING AS EXPECTED   Updated about POC. Reinforce call light use.pt acknowledged understanding    Problem: Venous Thromboembolism (VTW)/Deep Vein Thrombosis (DVT) Prevention:  Goal: Patient will participate in Venous Thrombosis (VTE)/Deep Vein Thrombosis (DVT)Prevention Measures  Outcome: PROGRESSING AS EXPECTED  Lovenox sq     Problem: Respiratory:  Goal: Respiratory status will improve  Outcome: PROGRESSING AS EXPECTED  Encouraged IS use. O2 on RA

## 2021-03-18 NOTE — PROGRESS NOTES
Report received.  Assessment complete.  A&O x 4. Patient calls appropriately.  Patient ambulates with no assistance.   Patient denies pain at this time.   Denies N&V. Tolerating regular diet.  + void, + flatus, last BM 3/17.  Patient denies SOB.  Patient sitting in bed. Right chest tube to water seal. No leaks or output noted. Dressing CDI.   Red rash to back and abdomen. Patient states Benadryl is effective for the itch. Pt appears anxious, awaiting on x ray results.      Review plan with of care with patient. Call light and personal belongings with in reach. Hourly rounding in place. All needs met at this time.

## 2021-03-18 NOTE — CARE PLAN
Problem: Communication  Goal: The ability to communicate needs accurately and effectively will improve  Outcome: PROGRESSING AS EXPECTED  Note: Patient is able to communicate with healthcare team. Utilizes call light when assistance is needed.      Problem: Respiratory:  Goal: Respiratory status will improve  Outcome: PROGRESSING AS EXPECTED  Note: Patient educated on IS. IS encouraged through out shift. Patient demonstrated appropriate use of IS.

## 2021-03-18 NOTE — PROGRESS NOTES
Trauma / Surgical Daily Progress Note    Date of Service  3/18/2021    Chief Complaint  40 y.o. male admitted 3/11/2021 with Right pneumothorax with rib fractures    Interval Events  Chest x-ray without pneumothorax  Chest tube with minimal output    - Plan chest tube removal today    Review of Systems  Review of Systems   Constitutional: Negative for chills and fever.   Eyes: Negative for double vision.   Respiratory: Negative for cough and shortness of breath.    Cardiovascular: Negative for palpitations.   Gastrointestinal: Negative for abdominal pain, nausea and vomiting.        3/17 BM   Genitourinary:        Voiding    Musculoskeletal: Positive for myalgias (chest wall). Negative for back pain, joint pain and neck pain.   Skin: Positive for rash.   Neurological: Negative for tingling, sensory change and focal weakness.        Vital Signs  Temp:  [36.2 °C (97.2 °F)-36.9 °C (98.5 °F)] 36.2 °C (97.2 °F)  Pulse:  [] 68  Resp:  [17-18] 17  BP: (103-113)/(62-72) 103/62  SpO2:  [94 %-99 %] 95 %    Physical Exam  Physical Exam  Vitals and nursing note reviewed.   Constitutional:       General: He is awake. He is not in acute distress.     Appearance: Normal appearance. He is not ill-appearing or toxic-appearing.   HENT:      Head: Normocephalic.      Nose: Nose normal.      Mouth/Throat:      Mouth: Mucous membranes are moist.      Pharynx: Oropharynx is clear.   Eyes:      Extraocular Movements: Extraocular movements intact.      Conjunctiva/sclera: Conjunctivae normal.   Cardiovascular:      Rate and Rhythm: Normal rate and regular rhythm.      Pulses: Normal pulses.      Heart sounds: Normal heart sounds.   Pulmonary:      Effort: Pulmonary effort is normal. No respiratory distress.      Breath sounds: Normal breath sounds.      Comments: Right chest tube in place, no air leak   Chest:      Chest wall: Tenderness present.   Abdominal:      General: There is no distension.      Palpations: Abdomen is soft.       Tenderness: There is no abdominal tenderness.   Musculoskeletal:         General: No tenderness.      Cervical back: Neck supple. No tenderness.   Skin:     General: Skin is warm and dry.      Capillary Refill: Capillary refill takes less than 2 seconds.      Comments: Maculopapular rash noted to back, minimally noted near chest tube dressing on right side- not noted on legs or arms    Neurological:      General: No focal deficit present.      Mental Status: He is alert and oriented to person, place, and time.   Psychiatric:         Behavior: Behavior is cooperative.         Laboratory  No results found for this or any previous visit (from the past 24 hour(s)).    Fluids    Intake/Output Summary (Last 24 hours) at 3/18/2021 0924  Last data filed at 3/18/2021 0406  Gross per 24 hour   Intake 240 ml   Output 20 ml   Net 220 ml       Core Measures & Quality Metrics  Labs reviewed, Medications reviewed and Radiology images reviewed  Gonzalez catheter: No Gonzalez      DVT Prophylaxis: Enoxaparin (Lovenox)  DVT prophylaxis - mechanical: SCDs  Ulcer prophylaxis: Not indicated    Assessed for rehab: Patient returned to prior level of function, rehabilitation not indicated at this time    RAP Score Total: 4    ETOH Screening  CAGE Score: 0  Assessment complete date: 3/12/2021        Assessment/Plan  Multiple rib fractures involving four or more ribs- (present on admission)  Assessment & Plan  Acute fractures of right 2nd through 7th ribs.  Aggressive multimodal pain management, and pulmonary hygiene.    Serial chest radiographs.     Traumatic hemopneumothorax- (present on admission)  Assessment & Plan  Moderate right pneumothorax. Right hemothorax.  Tube thoracostomy placed in ED with follow up resolution of pneumothorax.  3/12 Chest tube to water seal  3/13 Chest tube returned to suction for slightly worse mild to moderate right apical pneumothorax.  3/14 CXR with no appreciable right pneumothorax. Chest tube to water seal.    3/15 Chest tube returned to suction for new small right pneumothorax.   3/16 No pneumothorax on AM chest x-ray. Continue chest tube to suction.   3/17 Chest x-ray without pneumothorax, chest tube to water seal   3/18 Chest x-ray without pneumothorax  - Total in pleura vac 670ml / 20ml 24 hour total output  Aggressive pulmonary hygiene. Serial chest radiographs.    No contraindication to deep vein thrombosis (DVT) prophylaxis- (present on admission)  Assessment & Plan  Prophylactic dose enoxaparin initiated upon admission.      Screening examination for infectious disease- (present on admission)  Assessment & Plan  Admission SARS-CoV-2 testing negative. Repeat SARS-CoV-2 testing not indicated. Isolation precautions de-escalated.     Trauma- (present on admission)  Assessment & Plan  Fell two days prior to admission.  Trauma Green Activation.  Eleazar Harvey MD. Trauma Surgery.         Discussed patient condition with RN, Patient and trauma surgery, Dr. Harvey.

## 2021-03-18 NOTE — PROGRESS NOTES
Assumed care at 1845. Pt resting in bed. A&ox 4  Ambulating independently  O2 on RA  Rt CT to waterseal. No airleak noted  Pain controlled with ibuprofen and tylenol  Tolerating diet  +BM voiding  Call light within reach. Hourly rounding in place

## 2021-03-19 ENCOUNTER — HOSPITAL ENCOUNTER (OUTPATIENT)
Dept: RADIOLOGY | Facility: MEDICAL CENTER | Age: 41
End: 2021-03-19
Attending: SURGERY
Payer: MEDICAID

## 2021-03-19 VITALS
RESPIRATION RATE: 18 BRPM | WEIGHT: 162.26 LBS | TEMPERATURE: 98.2 F | DIASTOLIC BLOOD PRESSURE: 75 MMHG | BODY MASS INDEX: 25.47 KG/M2 | OXYGEN SATURATION: 93 % | SYSTOLIC BLOOD PRESSURE: 116 MMHG | HEIGHT: 67 IN | HEART RATE: 71 BPM

## 2021-03-19 PROCEDURE — 71045 X-RAY EXAM CHEST 1 VIEW: CPT

## 2021-03-19 PROCEDURE — A9270 NON-COVERED ITEM OR SERVICE: HCPCS | Performed by: SURGERY

## 2021-03-19 PROCEDURE — 700102 HCHG RX REV CODE 250 W/ 637 OVERRIDE(OP): Performed by: SURGERY

## 2021-03-19 RX ORDER — ACETAMINOPHEN 500 MG
1000 TABLET ORAL EVERY 6 HOURS PRN
Qty: 30 TABLET | Refills: 0 | COMMUNITY
Start: 2021-03-19

## 2021-03-19 RX ORDER — DIPHENHYDRAMINE HCL 25 MG
25 TABLET ORAL EVERY 4 HOURS PRN
Qty: 30 TABLET | Refills: 0 | COMMUNITY
Start: 2021-03-19

## 2021-03-19 RX ADMIN — ACETAMINOPHEN 1000 MG: 500 TABLET, FILM COATED ORAL at 04:55

## 2021-03-19 ASSESSMENT — ENCOUNTER SYMPTOMS
FEVER: 0
DOUBLE VISION: 0
FOCAL WEAKNESS: 0
SHORTNESS OF BREATH: 0
CHILLS: 0
MYALGIAS: 1
TINGLING: 0
ROS GI COMMENTS: 3/17 BM
NECK PAIN: 0
ABDOMINAL PAIN: 0
COUGH: 0
VOMITING: 0
BACK PAIN: 0
SENSORY CHANGE: 0
PALPITATIONS: 0
NAUSEA: 0

## 2021-03-19 ASSESSMENT — PAIN DESCRIPTION - PAIN TYPE: TYPE: ACUTE PAIN

## 2021-03-19 NOTE — PROGRESS NOTES
Trauma / Surgical Daily Progress Note    Date of Service  3/19/2021    Chief Complaint  40 y.o. male admitted 3/11/2021 with Right pneumothorax with rib fractures    Interval Events  Chest x-ray without pneumothorax following chest tube removal  Pain control remains adequate    - Discharge home today    Review of Systems  Review of Systems   Constitutional: Negative for chills and fever.   Eyes: Negative for double vision.   Respiratory: Negative for cough and shortness of breath.    Cardiovascular: Negative for palpitations.   Gastrointestinal: Negative for abdominal pain, nausea and vomiting.        3/17 BM    Genitourinary:        Voiding   Musculoskeletal: Positive for myalgias (chest wall). Negative for back pain, joint pain and neck pain.   Skin: Positive for rash.   Neurological: Negative for tingling, sensory change and focal weakness.        Vital Signs  Temp:  [36.2 °C (97.1 °F)-36.4 °C (97.6 °F)] 36.2 °C (97.1 °F)  Pulse:  [84-89] 84  Resp:  [16-18] 16  BP: (106-120)/(64-85) 106/64  SpO2:  [94 %-95 %] 94 %    Physical Exam  Physical Exam  Vitals and nursing note reviewed.   Constitutional:       General: He is awake. He is not in acute distress.     Appearance: Normal appearance. He is not ill-appearing or toxic-appearing.   HENT:      Head: Normocephalic.      Nose: Nose normal.      Mouth/Throat:      Mouth: Mucous membranes are moist.      Pharynx: Oropharynx is clear.   Eyes:      Extraocular Movements: Extraocular movements intact.      Conjunctiva/sclera: Conjunctivae normal.   Cardiovascular:      Rate and Rhythm: Normal rate and regular rhythm.      Pulses: Normal pulses.      Heart sounds: Normal heart sounds.   Pulmonary:      Effort: Pulmonary effort is normal. No respiratory distress.      Breath sounds: Normal breath sounds.      Comments: Gauze dressing in place from chest tube removal with serous drainage noted  Chest:      Chest wall: No tenderness.   Abdominal:      General: There is no  distension.      Palpations: Abdomen is soft.      Tenderness: There is no abdominal tenderness.   Musculoskeletal:         General: No tenderness.      Cervical back: Neck supple. No tenderness.   Skin:     General: Skin is warm and dry.      Capillary Refill: Capillary refill takes less than 2 seconds.      Comments: Maculopapular rash noted to back, minimally noted near chest tube dressing on right side- not noted on legs or arms    Neurological:      General: No focal deficit present.      Mental Status: He is alert and oriented to person, place, and time.   Psychiatric:         Behavior: Behavior normal. Behavior is cooperative.         Laboratory  No results found for this or any previous visit (from the past 24 hour(s)).    Fluids    Intake/Output Summary (Last 24 hours) at 3/19/2021 0846  Last data filed at 3/18/2021 1934  Gross per 24 hour   Intake 240 ml   Output no documentation   Net 240 ml       Core Measures & Quality Metrics  Medications reviewed and Radiology images reviewed  Gonzalez catheter: No Gonzalez      DVT Prophylaxis: Enoxaparin (Lovenox)  DVT prophylaxis - mechanical: SCDs  Ulcer prophylaxis: Not indicated    Assessed for rehab: Patient returned to prior level of function, rehabilitation not indicated at this time    RAP Score Total: 4    ETOH Screening  CAGE Score: 0  Assessment complete date: 3/12/2021        Assessment/Plan  Multiple rib fractures involving four or more ribs- (present on admission)  Assessment & Plan  Acute fractures of right 2nd through 7th ribs.  Aggressive multimodal pain management, and pulmonary hygiene.    Serial chest radiographs.     Traumatic hemopneumothorax- (present on admission)  Assessment & Plan  Moderate right pneumothorax. Right hemothorax.  Tube thoracostomy placed in ED with follow up resolution of pneumothorax.  3/12 Chest tube to water seal  3/13 Chest tube returned to suction for slightly worse mild to moderate right apical pneumothorax.  3/14 CXR with  no appreciable right pneumothorax. Chest tube to water seal.   3/15 Chest tube returned to suction for new small right pneumothorax.   3/16 No pneumothorax on AM chest x-ray. Continue chest tube to suction.   3/17 Chest x-ray without pneumothorax, chest tube to water seal   3/18 Chest x-ray without pneumothorax  - Interval removal of chest tube  3/19 Chest x-ray without pneumothorax   Aggressive pulmonary hygiene. Serial chest radiographs.    No contraindication to deep vein thrombosis (DVT) prophylaxis- (present on admission)  Assessment & Plan  Prophylactic dose enoxaparin initiated upon admission.      Screening examination for infectious disease- (present on admission)  Assessment & Plan  Admission SARS-CoV-2 testing negative. Repeat SARS-CoV-2 testing not indicated. Isolation precautions de-escalated.     Trauma- (present on admission)  Assessment & Plan  Fell two days prior to admission.  Trauma Green Activation.  Eleazar Harvey MD. Trauma Surgery.         Discussed patient condition with RN, Patient and trauma surgery, Dr. Harvey.

## 2021-03-19 NOTE — DISCHARGE SUMMARY
Trauma Discharge Summary    DATE OF ADMISSION: 3/11/2021    DATE OF DISCHARGE: 3/19/2021    LENGTH OF STAY: 6 days    ATTENDING PHYSICIAN: Eleazar Harvey M.D.    CONSULTING PHYSICIAN:   No consultations during this hospital course    DISCHARGE DIAGNOSIS:  1.  Traumatic hemopneumothorax  2.  Multiple rib fractures involving 4 or more ribs  3.  No contraindication to DVT prophylaxis  4.  Screening examination infectious disease  5.  Trauma    PROCEDURES:  1. Procedure completed by Dr. Harvey on 3/11/2021, chest tube placement.    HISTORY OF PRESENT ILLNESS: The patient is a 40 y.o. male who was injured in a ground-level fall.  He was subsequently transferred to [facility] for definite trauma care.  He was triaged as a Trauma in accordance with established pre-hospital protocols.    HOSPITAL COURSE: On arrival, he underwent extensive radiographic and laboratory studies and was admitted to the critical care team under the direction and supervision of Dr. Harvey.  He sustained the listed injuries and incurred the listed diagnosis during his stay.  Admission SARS-COV-2 testing was negative for COVID-19.    He was transferred from the emergency department to the general surgical drake where a tertiary exam was performed.     Admission imaging was significant for a moderate right hemopneumothorax, tube thoracostomy was completed in the emergency department with follow-up chest x-ray demonstrating resolution of the pneumothorax.  The chest tube was placed to waterseal on 3/12.  Follow-up imaging on March 13 demonstrated mild to moderate right apical pneumothorax and the chest tube was returned to suction.  Chest x-ray on March 14 demonstrated no appreciable right pneumothorax and the chest tube was returned to waterseal.  Chest x-ray on March 15 demonstrated a new small right pneumothorax and the chest tube was subsequently returned to suction.  Chest tube remained on suction until March 17 when chest x-ray  did not demonstrate any pneumothorax the chest tube was placed to waterseal.  Chest x-ray on March 18 did not demonstrate any pneumothorax and the chest tube was subsequently removed.  Chest x-ray on March 19 did not demonstrate any residual pneumothorax.    He was also noted to have acute fractures of right ribs 2 through 7.  He did receive aggressive multimodal pain management pulmonary hygiene and serial chest radiographs.    There was no contraindication to DVT prophylaxis, prophylactic Lovenox was initiated on admission.  On day of discharge patient is ambulating well.    On the day of discharge his oxygen saturation is greater than 90% on room air, there is no pneumothorax on chest x-ray, he reports adequate pain control.    DISCHARGE PHYSICAL EXAM: See epic physical exam dated 3/19/2021    DISCHARGE MEDICATIONS:  I reviewed the patients controlled substance history and obtained a controlled substance use informed consent (if applicable) provided by [facility] and the patient has been prescribed.       Medication List      Start taking these medications      Instructions   acetaminophen 500 MG Tabs  Commonly known as: TYLENOL   Take 2 Tablets by mouth every 6 hours as needed for Mild Pain.  Dose: 1,000 mg     diphenhydrAMINE 25 MG Tabs  Commonly known as: BENADRYL   Take 1 tablet by mouth every four hours as needed.  Dose: 25 mg        Change how you take these medications      Instructions   ibuprofen 600 MG Tabs  What changed: Another medication with the same name was removed. Continue taking this medication, and follow the directions you see here.  Commonly known as: MOTRIN   Take 1 Tab by mouth every 6 hours as needed.  Dose: 600 mg        Stop taking these medications    HYDROcodone-acetaminophen 5-325 MG Tabs per tablet  Commonly known as: NORCO            DISPOSITION: The patient will be discharged home in stable condition on 3/19/2021.  He will follow up with Dr. Harvey in 1  week.    The patient has been extensively counseled and all questions have been answered. Special attention was paid to respiratory decompensation, increased pain, drainage from chest tube removal site and signs and symptoms of infection and to seek immediate medical attention if these develop. The patient demonstrates understanding and gives verbal compliance with discharge instructions.    TIME SPENT ON DISCHARGE: 35 minutes      ____________________________________________  KARI Otero    DD: 3/19/2021 8:52 AM

## 2021-03-19 NOTE — PROGRESS NOTES
"Patient dressing reinforced with ABD pad and tegaderm at apprx. 0827 3/18/21, a \"nickel\" sized amount of tan drainage is on the ABD pad at this time.     Will alert day shift RN and monitor   "

## 2021-03-19 NOTE — CARE PLAN
Problem: Communication  Goal: The ability to communicate needs accurately and effectively will improve  Outcome: PROGRESSING AS EXPECTED  Note: Patient able to communicate needs with healthcare team. Utilizes call light when assistance is needed.      Problem: Discharge Barriers/Planning  Goal: Patient's continuum of care needs will be met  Outcome: PROGRESSING AS EXPECTED  Note: Discharge order received. Patient encouraged to find a mode of transportation home.      Problem: Respiratory:  Goal: Respiratory status will improve  Outcome: PROGRESSING AS EXPECTED  Note: Use of IS was reinforced. Turn, cough, deep breathing and ambulating was encouraged.

## 2021-03-19 NOTE — PROGRESS NOTES
Report received.  Assessment complete.  A&O x 4. Patient calls appropriately.  Patient ambulates with no assistance.   Patient has 1/10 pain. Pain managed with prescribed medications and rest.  Denies N&V. Tolerating regular diet.  + void, + flatus, last BM 3/18/2021.  Patient denies SOB. Chest tube removed yesterday.     Patient sitting in bed. Pt pain controlled, tolerating regular diet, ambulating to bathroom. Anticipating discharge today.     Review plan with of care with patient. Call light and personal belongings with in reach. Hourly rounding in place. All needs met at this time.

## 2021-03-19 NOTE — DISCHARGE INSTRUCTIONS
Discharge Instructions    Discharged to home by car with friend. Discharged via wheelchair, hospital escort: Yes.  Special equipment needed: Not Applicable    Be sure to schedule a follow-up appointment with your primary care doctor or any specialists as instructed.     Discharge Plan:   Diet Plan: Discussed  Activity Level: Discussed  Confirmed Follow up Appointment: Patient to Call and Schedule Appointment  Confirmed Symptoms Management: Discussed  Medication Reconciliation Updated: Yes  Influenza Vaccine Indication: Patient Refuses    I understand that a diet low in cholesterol, fat, and sodium is recommended for good health. Unless I have been given specific instructions below for another diet, I accept this instruction as my diet prescription.   Other diet: none    Special Instructions: None    · Is patient discharged on Warfarin / Coumadin?   No     Depression / Suicide Risk    As you are discharged from this [facility] facility, it is important to learn how to keep safe from harming yourself.    Recognize the warning signs:  · Abrupt changes in personality, positive or negative- including increase in energy   · Giving away possessions  · Change in eating patterns- significant weight changes-  positive or negative  · Change in sleeping patterns- unable to sleep or sleeping all the time   · Unwillingness or inability to communicate  · Depression  · Unusual sadness, discouragement and loneliness  · Talk of wanting to die  · Neglect of personal appearance   · Rebelliousness- reckless behavior  · Withdrawal from people/activities they love  · Confusion- inability to concentrate     If you or a loved one observes any of these behaviors or has concerns about self-harm, here's what you can do:  · Talk about it- your feelings and reasons for harming yourself  · Remove any means that you might use to hurt yourself (examples: pills, rope, extension cords, firearm)  · Get professional help from the community (Mental  Health, Substance Abuse, psychological counseling)  · Do not be alone:Call your Safe Contact- someone whom you trust who will be there for you.  · Call your local CRISIS HOTLINE 215-9034 or 226-879-1226  · Call your local Children's Mobile Crisis Response Team [place] (492) 975-7168 or www.[employer] [facility]  · Call the toll free National Suicide Prevention Hotlines   · National Suicide Prevention Lifeline 834-188-HTIY (2270)  · National Hope Line Network 800-SUICIDE (197-4332)    Chest Tube Insertion, Adult, Care After  This sheet gives you information about how to care for yourself after your procedure. Your health care provider may also give you more specific instructions. If you have problems or questions, contact your health care provider.  What can I expect after the procedure?  After the procedure, it is common to have:  · Mild discomfort.  · Slight bruising.  Follow these instructions at home:  Incision care    · Follow instructions from your health care provider about how to take care of your incision. Make sure you:  ? Wash your hands with soap and water before you change your bandage (dressing). If soap and water are not available, use hand .  ? Change your dressing as told by your health care provider.  ? Leave stitches (sutures), skin glue, or adhesive strips in place. These skin closures may need to stay in place for 2 weeks or longer. If adhesive strip edges start to loosen and curl up, you may trim the loose edges. Do not remove adhesive strips completely unless your health care provider tells you to do that.  · Check your incision area every day for signs of infection. Check for:  ? More redness, swelling, or pain.  ? More fluid or blood.  ? Warmth.  ? Pus or a bad smell.  · Do not take baths, swim, or use a hot tub until your health care provider approves. Ask your health care provider if you can take showers or have a sponge bath. You may need to keep the incision area dry for a few  days.  General instructions  · Take over-the-counter and prescription medicines only as told by your health care provider.  · Return to your normal activities as told by your health care provider. Ask your health care provider what activities are safe.  · Keep all follow-up visits as told by your health care provider. This is important.  · Do not drive for 24 hours if you were given a medicine to help you relax (sedative).  Contact a health care provider if:  · You have chills or fever.  · You have pain that is not controlled with medicine.  · You have more redness, swelling, or pain around your incision.  · You have more fluid or blood coming from your incision.  · Your incision feels warm to the touch.  · You have pus or a bad smell coming from the incision.  Get help right away if:  · You have chest pain or trouble breathing.  · You develop red streaking on your skin around your incision.  This information is not intended to replace advice given to you by your health care provider. Make sure you discuss any questions you have with your health care provider.  Document Released: 10/08/2014 Document Revised: 11/30/2018 Document Reviewed: 05/31/2017  [employer] Patient Education © 2020 [employer]      Rib Fracture    A rib fracture is a break or crack in one of the bones of the ribs. The ribs are like a cage that goes around your upper chest. A broken or cracked rib is often painful, but most do not cause other problems. Most rib fractures usually heal on their own in 1-3 months.  Follow these instructions at home:  Managing pain, stiffness, and swelling  · If directed, apply ice to the injured area.  ? Put ice in a plastic bag.  ? Place a towel between your skin and the bag.  ? Leave the ice on for 20 minutes, 2-3 times a day.  · Take over-the-counter and prescription medicines only as told by your doctor.  Activity  · Avoid activities that cause pain to the injured area. Protect your injured area.  · Slowly  increase activity as told by your doctor.  General instructions  · Do deep breathing as told by your doctor. You may be told to:  ? Take deep breaths many times a day.  ? Cough many times a day while hugging a pillow.  ? Use a device (incentive spirometer) to do deep breathing many times a day.  · Drink enough fluid to keep your pee (urine) clear or pale yellow.  · Do not wear a rib belt or binder. These do not allow you to breathe deeply.  · Keep all follow-up visits as told by your doctor. This is important.  Contact a doctor if:  · You have a fever.  Get help right away if:  · You have trouble breathing.  · You are short of breath.  · You cannot stop coughing.  · You cough up thick or bloody spit (sputum).  · You feel sick to your stomach (nauseous), throw up (vomit), or have belly (abdominal) pain.  · Your pain gets worse and medicine does not help.  Summary  · A rib fracture is a break or crack in one of the bones of the ribs.  · Apply ice to the injured area and take medicines for pain as told by your doctor.  · Take deep breaths and cough many times a day. Hug a pillow every time you cough.  This information is not intended to replace advice given to you by your health care provider. Make sure you discuss any questions you have with your health care provider.  Document Released: 09/26/2009 Document Revised: 11/30/2018 Document Reviewed: 03/20/2018  [employer] Patient Education © 2020 [employer]

## 2021-03-19 NOTE — PROGRESS NOTES
Bedside report received from day shift RN.   Assessment complete.  A&O x 4. Patient calls appropriately.  Patient ambulates with self assist.   Patient has 6/10 pain. Pain managed with prescribed medications.  Denies N&V. Tolerating diet.  + void, + flatus, - BM.  Patient denies SOB.  Patient old chest tube site draining. Upon assessment at 2000 the gauze within the tegaderm is saturated with clear/tan serosanguinous fluid . Only given orders to reinforce.     Reviewed plan of care with patient. Call light and personal belongings with in reach. Hourly rounding in place. All needs met at this time.

## 2021-03-19 NOTE — PROGRESS NOTES
Discharged per order. Discharge instructions given and signed. No prescriptions ordered. Pt belongings collected. Pt discharged home with friend. IV discontinued.

## 2021-10-14 ENCOUNTER — APPOINTMENT (OUTPATIENT)
Dept: RADIOLOGY | Facility: MEDICAL CENTER | Age: 41
End: 2021-10-14
Attending: EMERGENCY MEDICINE
Payer: MEDICAID

## 2021-10-14 ENCOUNTER — HOSPITAL ENCOUNTER (EMERGENCY)
Facility: MEDICAL CENTER | Age: 41
End: 2021-10-15
Attending: EMERGENCY MEDICINE
Payer: MEDICAID

## 2021-10-14 DIAGNOSIS — F10.920 ALCOHOLIC INTOXICATION WITHOUT COMPLICATION (HCC): ICD-10-CM

## 2021-10-14 DIAGNOSIS — S32.591A CLOSED FRACTURE OF RAMUS OF RIGHT PUBIS, INITIAL ENCOUNTER (HCC): ICD-10-CM

## 2021-10-14 DIAGNOSIS — S42.034A CLOSED NONDISPLACED FRACTURE OF ACROMIAL END OF RIGHT CLAVICLE, INITIAL ENCOUNTER: ICD-10-CM
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
## 2021-10-14 DIAGNOSIS — S22.41XA CLOSED FRACTURE OF MULTIPLE RIBS OF RIGHT SIDE, INITIAL ENCOUNTER: ICD-10-CM

## 2021-10-14 LAB
ABO GROUP BLD: NORMAL
ALBUMIN SERPL BCP-MCNC: 3.9 G/DL (ref 3.2–4.9)
ALBUMIN/GLOB SERPL: 1.6 G/DL
ALP SERPL-CCNC: 114 U/L (ref 30–99)
ALT SERPL-CCNC: 46 U/L (ref 2–50)
ANION GAP SERPL CALC-SCNC: 11 MMOL/L (ref 7–16)
APTT PPP: 26.5 SEC (ref 24.7–36)
AST SERPL-CCNC: 43 U/L (ref 12–45)
BILIRUB SERPL-MCNC: <0.2 MG/DL (ref 0.1–1.5)
BLD GP AB SCN SERPL QL: NORMAL
BUN SERPL-MCNC: 7 MG/DL (ref 8–22)
CALCIUM SERPL-MCNC: 7.6 MG/DL (ref 8.5–10.5)
CHLORIDE SERPL-SCNC: 108 MMOL/L (ref 96–112)
CO2 SERPL-SCNC: 23 MMOL/L (ref 20–33)
CREAT SERPL-MCNC: 0.77 MG/DL (ref 0.5–1.4)
ERYTHROCYTE [DISTWIDTH] IN BLOOD BY AUTOMATED COUNT: 47.6 FL (ref 35.9–50)
ETHANOL BLD-MCNC: 267.5 MG/DL (ref 0–10)
GLOBULIN SER CALC-MCNC: 2.4 G/DL (ref 1.9–3.5)
GLUCOSE SERPL-MCNC: 104 MG/DL (ref 65–99)
HCT VFR BLD AUTO: 39 % (ref 42–52)
HGB BLD-MCNC: 13.3 G/DL (ref 14–18)
INR PPP: 1.07 (ref 0.87–1.13)
MCH RBC QN AUTO: 33 PG (ref 27–33)
MCHC RBC AUTO-ENTMCNC: 34.1 G/DL (ref 33.7–35.3)
MCV RBC AUTO: 96.8 FL (ref 81.4–97.8)
PLATELET # BLD AUTO: 277 K/UL (ref 164–446)
PMV BLD AUTO: 8.3 FL (ref 9–12.9)
POTASSIUM SERPL-SCNC: 3.5 MMOL/L (ref 3.6–5.5)
PROT SERPL-MCNC: 6.3 G/DL (ref 6–8.2)
PROTHROMBIN TIME: 13.6 SEC (ref 12–14.6)
RBC # BLD AUTO: 4.03 M/UL (ref 4.7–6.1)
RH BLD: NORMAL
SODIUM SERPL-SCNC: 142 MMOL/L (ref 135–145)
WBC # BLD AUTO: 6.6 K/UL (ref 4.8–10.8)

## 2021-10-14 PROCEDURE — 73030 X-RAY EXAM OF SHOULDER: CPT | Mod: RT

## 2021-10-14 PROCEDURE — 85027 COMPLETE CBC AUTOMATED: CPT

## 2021-10-14 PROCEDURE — 86900 BLOOD TYPING SEROLOGIC ABO: CPT

## 2021-10-14 PROCEDURE — 72125 CT NECK SPINE W/O DYE: CPT

## 2021-10-14 PROCEDURE — 82077 ASSAY SPEC XCP UR&BREATH IA: CPT

## 2021-10-14 PROCEDURE — 85610 PROTHROMBIN TIME: CPT

## 2021-10-14 PROCEDURE — 71260 CT THORAX DX C+: CPT

## 2021-10-14 PROCEDURE — 80053 COMPREHEN METABOLIC PANEL: CPT

## 2021-10-14 PROCEDURE — 86901 BLOOD TYPING SEROLOGIC RH(D): CPT

## 2021-10-14 PROCEDURE — 99285 EMERGENCY DEPT VISIT HI MDM: CPT

## 2021-10-14 PROCEDURE — 86850 RBC ANTIBODY SCREEN: CPT

## 2021-10-14 PROCEDURE — 72128 CT CHEST SPINE W/O DYE: CPT

## 2021-10-14 PROCEDURE — 72131 CT LUMBAR SPINE W/O DYE: CPT

## 2021-10-14 PROCEDURE — 73600 X-RAY EXAM OF ANKLE: CPT | Mod: LT

## 2021-10-14 PROCEDURE — 70450 CT HEAD/BRAIN W/O DYE: CPT

## 2021-10-14 PROCEDURE — 72170 X-RAY EXAM OF PELVIS: CPT

## 2021-10-14 PROCEDURE — 700117 HCHG RX CONTRAST REV CODE 255: Performed by: EMERGENCY MEDICINE

## 2021-10-14 PROCEDURE — 305948 HCHG GREEN TRAUMA ACT PRE-NOTIFY NO CC

## 2021-10-14 PROCEDURE — 71045 X-RAY EXAM CHEST 1 VIEW: CPT

## 2021-10-14 PROCEDURE — 85730 THROMBOPLASTIN TIME PARTIAL: CPT

## 2021-10-14 RX ORDER — HYDROXYZINE HYDROCHLORIDE 10 MG/1
10 TABLET, FILM COATED ORAL 4 TIMES DAILY PRN
COMMUNITY

## 2021-10-14 RX ORDER — ESCITALOPRAM OXALATE 10 MG/1
10 TABLET ORAL DAILY
COMMUNITY

## 2021-10-14 RX ADMIN — IOHEXOL 100 ML: 350 INJECTION, SOLUTION INTRAVENOUS at 19:55

## 2021-10-15 VITALS
HEIGHT: 68 IN | HEART RATE: 96 BPM | RESPIRATION RATE: 18 BRPM | SYSTOLIC BLOOD PRESSURE: 101 MMHG | WEIGHT: 178 LBS | TEMPERATURE: 98.1 F | DIASTOLIC BLOOD PRESSURE: 61 MMHG | OXYGEN SATURATION: 98 % | BODY MASS INDEX: 26.98 KG/M2

## 2021-10-15 RX ORDER — HYDROCODONE BITARTRATE AND ACETAMINOPHEN 5; 325 MG/1; MG/1
1 TABLET ORAL EVERY 6 HOURS PRN
Qty: 12 TABLET | Refills: 0 | Status: SHIPPED | OUTPATIENT
Start: 2021-10-15 | End: 2021-10-18

## 2021-10-15 RX ORDER — IBUPROFEN 600 MG/1
600 TABLET ORAL EVERY 6 HOURS PRN
Qty: 30 TABLET | Refills: 0 | Status: SHIPPED | OUTPATIENT
Start: 2021-10-15

## 2021-10-15 NOTE — DISCHARGE INSTRUCTIONS
Your x-rays and scans show that you have broken your collarbone, 3 ribs as well as a bone in your pelvis. None of these are dangerous and require surgery and you can take the pain medication prescribed to help with these. The sling for comfort of your clavicle, and follow-up with the orthopedic doctor above

## 2021-10-15 NOTE — ED NOTES
Assist RN   Assist Pt to stand at bedside to use urinal, Pt became pale and diaphoretic. Pt able to use urinal w/o assistance. Pt right arm placed in sling.
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
## 2021-10-15 NOTE — ED NOTES
Pt placed in sling by ED tech. Reviewed discharge instructions, pt verbalized understanding of instructions and medication. States he will schedule follow-up appointment. Denies further questions at this time. Pt wheeled out of ED and provided cab voucher.
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
## 2022-08-03 ENCOUNTER — APPOINTMENT (OUTPATIENT)
Dept: RADIOLOGY | Facility: IMAGING CENTER | Age: 42
End: 2022-08-03
Attending: NURSE PRACTITIONER
Payer: MEDICAID

## 2022-08-03 ENCOUNTER — OFFICE VISIT (OUTPATIENT)
Dept: URGENT CARE | Facility: CLINIC | Age: 42
End: 2022-08-03
Payer: MEDICAID

## 2022-08-03 VITALS
HEART RATE: 85 BPM | SYSTOLIC BLOOD PRESSURE: 110 MMHG | OXYGEN SATURATION: 98 % | TEMPERATURE: 97.3 F | WEIGHT: 170 LBS | RESPIRATION RATE: 18 BRPM | BODY MASS INDEX: 26.68 KG/M2 | DIASTOLIC BLOOD PRESSURE: 80 MMHG | HEIGHT: 67 IN

## 2022-08-03 DIAGNOSIS — M25.562 ACUTE PAIN OF LEFT KNEE: ICD-10-CM

## 2022-08-03 PROCEDURE — 99203 OFFICE O/P NEW LOW 30 MIN: CPT | Performed by: NURSE PRACTITIONER

## 2022-08-03 PROCEDURE — 73562 X-RAY EXAM OF KNEE 3: CPT | Mod: TC,LT | Performed by: NURSE PRACTITIONER

## 2022-08-03 ASSESSMENT — ENCOUNTER SYMPTOMS
WEAKNESS: 0
VOMITING: 0
JOINT SWELLING: 1
EYE REDNESS: 0
SHORTNESS OF BREATH: 0
NUMBNESS: 0
FEVER: 0
SORE THROAT: 0
MYALGIAS: 0
DIZZINESS: 0
NAUSEA: 0
CHILLS: 0

## 2022-08-03 ASSESSMENT — FIBROSIS 4 INDEX: FIB4 SCORE: 1.01
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
## 2022-08-29 PROBLEM — S83.519A ACL (ANTERIOR CRUCIATE LIGAMENT) RUPTURE: Status: ACTIVE | Noted: 2022-08-29

## 2022-09-27 ENCOUNTER — PRE-ADMISSION TESTING (OUTPATIENT)
Dept: ADMISSIONS | Facility: MEDICAL CENTER | Age: 42
End: 2022-09-27
Attending: ORTHOPAEDIC SURGERY
Payer: MEDICAID

## 2022-09-27 DIAGNOSIS — Z01.812 PRE-OPERATIVE LABORATORY EXAMINATION: ICD-10-CM

## 2022-09-27 LAB
ALBUMIN SERPL BCP-MCNC: 4.3 G/DL (ref 3.2–4.9)
ALBUMIN/GLOB SERPL: 1.4 G/DL
ALP SERPL-CCNC: 156 U/L (ref 30–99)
ALT SERPL-CCNC: 30 U/L (ref 2–50)
ANION GAP SERPL CALC-SCNC: 12 MMOL/L (ref 7–16)
AST SERPL-CCNC: 31 U/L (ref 12–45)
BILIRUB SERPL-MCNC: 0.2 MG/DL (ref 0.1–1.5)
BUN SERPL-MCNC: 12 MG/DL (ref 8–22)
CALCIUM SERPL-MCNC: 8.9 MG/DL (ref 8.4–10.2)
CHLORIDE SERPL-SCNC: 104 MMOL/L (ref 96–112)
CO2 SERPL-SCNC: 24 MMOL/L (ref 20–33)
CREAT SERPL-MCNC: 0.87 MG/DL (ref 0.5–1.4)
ERYTHROCYTE [DISTWIDTH] IN BLOOD BY AUTOMATED COUNT: 42.2 FL (ref 35.9–50)
GFR SERPLBLD CREATININE-BSD FMLA CKD-EPI: 109 ML/MIN/1.73 M 2
GLOBULIN SER CALC-MCNC: 3 G/DL (ref 1.9–3.5)
GLUCOSE SERPL-MCNC: 89 MG/DL (ref 65–99)
HCT VFR BLD AUTO: 44 % (ref 42–52)
HGB BLD-MCNC: 14.9 G/DL (ref 14–18)
MCH RBC QN AUTO: 32.3 PG (ref 27–33)
MCHC RBC AUTO-ENTMCNC: 33.9 G/DL (ref 33.7–35.3)
MCV RBC AUTO: 95.2 FL (ref 81.4–97.8)
PLATELET # BLD AUTO: 337 K/UL (ref 164–446)
PMV BLD AUTO: 9.3 FL (ref 9–12.9)
POTASSIUM SERPL-SCNC: 4.5 MMOL/L (ref 3.6–5.5)
PROT SERPL-MCNC: 7.3 G/DL (ref 6–8.2)
RBC # BLD AUTO: 4.62 M/UL (ref 4.7–6.1)
SODIUM SERPL-SCNC: 140 MMOL/L (ref 135–145)
WBC # BLD AUTO: 7.2 K/UL (ref 4.8–10.8)

## 2022-09-27 PROCEDURE — 85027 COMPLETE CBC AUTOMATED: CPT

## 2022-09-27 PROCEDURE — 80053 COMPREHEN METABOLIC PANEL: CPT

## 2022-09-27 PROCEDURE — 36415 COLL VENOUS BLD VENIPUNCTURE: CPT

## 2022-09-27 ASSESSMENT — FIBROSIS 4 INDEX: FIB4 SCORE: 1.01

## 2022-09-27 NOTE — PREPROCEDURE INSTRUCTIONS
Pre admit appointment completed. History and medications reviewed. Pre-op instructions given, hand outs reviewed and questions answered. Pre admit video emailed.    Anesthesia fasting guidelines reviewed. Patient instructed to continue regularly prescribed medications through the day before surgery. Per anesthesia protocol, patient instructed to take these medications with a sip of water the day of surgery: tylenol prn, lexapro, atarax.    Patient did not have ride arranged at PAT appointment. Educated on need to arrange, patient verbalized understanding and states he will arrange prior to procedure.
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
## 2022-10-11 ENCOUNTER — ANESTHESIA EVENT (OUTPATIENT)
Dept: SURGERY | Facility: MEDICAL CENTER | Age: 42
End: 2022-10-11
Payer: MEDICAID

## 2022-10-11 ENCOUNTER — HOSPITAL ENCOUNTER (OUTPATIENT)
Facility: MEDICAL CENTER | Age: 42
End: 2022-10-11
Attending: ORTHOPAEDIC SURGERY | Admitting: ORTHOPAEDIC SURGERY
Payer: MEDICAID

## 2022-10-11 ENCOUNTER — ANESTHESIA (OUTPATIENT)
Dept: SURGERY | Facility: MEDICAL CENTER | Age: 42
End: 2022-10-11
Payer: MEDICAID

## 2022-10-11 VITALS
OXYGEN SATURATION: 90 % | DIASTOLIC BLOOD PRESSURE: 71 MMHG | TEMPERATURE: 97.2 F | WEIGHT: 170.86 LBS | BODY MASS INDEX: 25.89 KG/M2 | HEIGHT: 68 IN | RESPIRATION RATE: 18 BRPM | SYSTOLIC BLOOD PRESSURE: 122 MMHG | HEART RATE: 92 BPM

## 2022-10-11 DIAGNOSIS — S83.512A RUPTURE OF ANTERIOR CRUCIATE LIGAMENT OF LEFT KNEE, INITIAL ENCOUNTER: ICD-10-CM

## 2022-10-11 PROCEDURE — 700101 HCHG RX REV CODE 250: Performed by: ORTHOPAEDIC SURGERY

## 2022-10-11 PROCEDURE — 700102 HCHG RX REV CODE 250 W/ 637 OVERRIDE(OP): Performed by: STUDENT IN AN ORGANIZED HEALTH CARE EDUCATION/TRAINING PROGRAM

## 2022-10-11 PROCEDURE — 160035 HCHG PACU - 1ST 60 MINS PHASE I: Performed by: ORTHOPAEDIC SURGERY

## 2022-10-11 PROCEDURE — 160025 RECOVERY II MINUTES (STATS): Performed by: ORTHOPAEDIC SURGERY

## 2022-10-11 PROCEDURE — 160036 HCHG PACU - EA ADDL 30 MINS PHASE I: Performed by: ORTHOPAEDIC SURGERY

## 2022-10-11 PROCEDURE — G0289 ARTHRO, LOOSE BODY + CHONDRO: HCPCS | Mod: 74,59 | Performed by: ORTHOPAEDIC SURGERY

## 2022-10-11 PROCEDURE — 29881 ARTHRS KNE SRG MNISECTMY M/L: CPT | Mod: ASROC | Performed by: PHYSICIAN ASSISTANT

## 2022-10-11 PROCEDURE — C1713 ANCHOR/SCREW BN/BN,TIS/BN: HCPCS | Performed by: ORTHOPAEDIC SURGERY

## 2022-10-11 PROCEDURE — 29888 ARTHRS AID ACL RPR/AGMNTJ: CPT | Mod: ASROC,LT | Performed by: PHYSICIAN ASSISTANT

## 2022-10-11 PROCEDURE — 160048 HCHG OR STATISTICAL LEVEL 1-5: Performed by: ORTHOPAEDIC SURGERY

## 2022-10-11 PROCEDURE — 76942 ECHO GUIDE FOR BIOPSY: CPT | Mod: 26 | Performed by: STUDENT IN AN ORGANIZED HEALTH CARE EDUCATION/TRAINING PROGRAM

## 2022-10-11 PROCEDURE — G0289 ARTHRO, LOOSE BODY + CHONDRO: HCPCS | Mod: ASROC,74,59 | Performed by: PHYSICIAN ASSISTANT

## 2022-10-11 PROCEDURE — 64447 NJX AA&/STRD FEMORAL NRV IMG: CPT | Performed by: ORTHOPAEDIC SURGERY

## 2022-10-11 PROCEDURE — 700111 HCHG RX REV CODE 636 W/ 250 OVERRIDE (IP): Performed by: STUDENT IN AN ORGANIZED HEALTH CARE EDUCATION/TRAINING PROGRAM

## 2022-10-11 PROCEDURE — 700111 HCHG RX REV CODE 636 W/ 250 OVERRIDE (IP): Performed by: ORTHOPAEDIC SURGERY

## 2022-10-11 PROCEDURE — 700105 HCHG RX REV CODE 258: Performed by: ORTHOPAEDIC SURGERY

## 2022-10-11 PROCEDURE — 29888 ARTHRS AID ACL RPR/AGMNTJ: CPT | Mod: LT | Performed by: ORTHOPAEDIC SURGERY

## 2022-10-11 PROCEDURE — 160009 HCHG ANES TIME/MIN: Performed by: ORTHOPAEDIC SURGERY

## 2022-10-11 PROCEDURE — A9270 NON-COVERED ITEM OR SERVICE: HCPCS | Performed by: STUDENT IN AN ORGANIZED HEALTH CARE EDUCATION/TRAINING PROGRAM

## 2022-10-11 PROCEDURE — 160029 HCHG SURGERY MINUTES - 1ST 30 MINS LEVEL 4: Performed by: ORTHOPAEDIC SURGERY

## 2022-10-11 PROCEDURE — 160041 HCHG SURGERY MINUTES - EA ADDL 1 MIN LEVEL 4: Performed by: ORTHOPAEDIC SURGERY

## 2022-10-11 PROCEDURE — 110371 HCHG SHELL REV 272: Performed by: ORTHOPAEDIC SURGERY

## 2022-10-11 PROCEDURE — 29881 ARTHRS KNE SRG MNISECTMY M/L: CPT | Performed by: ORTHOPAEDIC SURGERY

## 2022-10-11 PROCEDURE — 160002 HCHG RECOVERY MINUTES (STAT): Performed by: ORTHOPAEDIC SURGERY

## 2022-10-11 PROCEDURE — 160046 HCHG PACU - 1ST 60 MINS PHASE II: Performed by: ORTHOPAEDIC SURGERY

## 2022-10-11 PROCEDURE — 700101 HCHG RX REV CODE 250: Performed by: STUDENT IN AN ORGANIZED HEALTH CARE EDUCATION/TRAINING PROGRAM

## 2022-10-11 PROCEDURE — 01400 ANES OPN/ARTHRS KNEE JT NOS: CPT | Performed by: STUDENT IN AN ORGANIZED HEALTH CARE EDUCATION/TRAINING PROGRAM

## 2022-10-11 PROCEDURE — 64447 NJX AA&/STRD FEMORAL NRV IMG: CPT | Mod: 59,LT | Performed by: STUDENT IN AN ORGANIZED HEALTH CARE EDUCATION/TRAINING PROGRAM

## 2022-10-11 DEVICE — BUTTON EXTENDER PROCINCH: Type: IMPLANTABLE DEVICE | Site: KNEE | Status: FUNCTIONAL

## 2022-10-11 DEVICE — PROCINCH RT: Type: IMPLANTABLE DEVICE | Site: KNEE | Status: FUNCTIONAL

## 2022-10-11 RX ORDER — OXYCODONE HCL 5 MG/5 ML
5 SOLUTION, ORAL ORAL
Status: COMPLETED | OUTPATIENT
Start: 2022-10-11 | End: 2022-10-11

## 2022-10-11 RX ORDER — ACETAMINOPHEN 500 MG
1000 TABLET ORAL ONCE
Status: COMPLETED | OUTPATIENT
Start: 2022-10-11 | End: 2022-10-11

## 2022-10-11 RX ORDER — EPINEPHRINE 1 MG/ML(1)
AMPUL (ML) INJECTION
Status: DISCONTINUED | OUTPATIENT
Start: 2022-10-11 | End: 2022-10-11 | Stop reason: HOSPADM

## 2022-10-11 RX ORDER — ONDANSETRON 2 MG/ML
4 INJECTION INTRAMUSCULAR; INTRAVENOUS
Status: COMPLETED | OUTPATIENT
Start: 2022-10-11 | End: 2022-10-11

## 2022-10-11 RX ORDER — HYDROMORPHONE HYDROCHLORIDE 1 MG/ML
0.4 INJECTION, SOLUTION INTRAMUSCULAR; INTRAVENOUS; SUBCUTANEOUS
Status: DISCONTINUED | OUTPATIENT
Start: 2022-10-11 | End: 2022-10-11 | Stop reason: HOSPADM

## 2022-10-11 RX ORDER — GABAPENTIN 300 MG/1
300 CAPSULE ORAL ONCE
Status: COMPLETED | OUTPATIENT
Start: 2022-10-11 | End: 2022-10-11

## 2022-10-11 RX ORDER — CEFAZOLIN SODIUM 1 G/3ML
2 INJECTION, POWDER, FOR SOLUTION INTRAMUSCULAR; INTRAVENOUS ONCE
Status: DISCONTINUED | OUTPATIENT
Start: 2022-10-11 | End: 2022-10-11 | Stop reason: HOSPADM

## 2022-10-11 RX ORDER — MIDAZOLAM HYDROCHLORIDE 1 MG/ML
INJECTION INTRAMUSCULAR; INTRAVENOUS PRN
Status: DISCONTINUED | OUTPATIENT
Start: 2022-10-11 | End: 2022-10-11 | Stop reason: SURG

## 2022-10-11 RX ORDER — HYDROMORPHONE HYDROCHLORIDE 1 MG/ML
0.2 INJECTION, SOLUTION INTRAMUSCULAR; INTRAVENOUS; SUBCUTANEOUS
Status: DISCONTINUED | OUTPATIENT
Start: 2022-10-11 | End: 2022-10-11 | Stop reason: HOSPADM

## 2022-10-11 RX ORDER — HYDROMORPHONE HYDROCHLORIDE 1 MG/ML
0.1 INJECTION, SOLUTION INTRAMUSCULAR; INTRAVENOUS; SUBCUTANEOUS
Status: DISCONTINUED | OUTPATIENT
Start: 2022-10-11 | End: 2022-10-11 | Stop reason: HOSPADM

## 2022-10-11 RX ORDER — HALOPERIDOL 5 MG/ML
1 INJECTION INTRAMUSCULAR
Status: DISCONTINUED | OUTPATIENT
Start: 2022-10-11 | End: 2022-10-11 | Stop reason: HOSPADM

## 2022-10-11 RX ORDER — MEPERIDINE HYDROCHLORIDE 25 MG/ML
12.5 INJECTION INTRAMUSCULAR; INTRAVENOUS; SUBCUTANEOUS
Status: DISCONTINUED | OUTPATIENT
Start: 2022-10-11 | End: 2022-10-11 | Stop reason: HOSPADM

## 2022-10-11 RX ORDER — METOPROLOL TARTRATE 1 MG/ML
1 INJECTION, SOLUTION INTRAVENOUS
Status: DISCONTINUED | OUTPATIENT
Start: 2022-10-11 | End: 2022-10-11 | Stop reason: HOSPADM

## 2022-10-11 RX ORDER — DIPHENHYDRAMINE HYDROCHLORIDE 50 MG/ML
12.5 INJECTION INTRAMUSCULAR; INTRAVENOUS
Status: DISCONTINUED | OUTPATIENT
Start: 2022-10-11 | End: 2022-10-11 | Stop reason: HOSPADM

## 2022-10-11 RX ORDER — PHENYLEPHRINE HYDROCHLORIDE 10 MG/ML
INJECTION, SOLUTION INTRAMUSCULAR; INTRAVENOUS; SUBCUTANEOUS PRN
Status: DISCONTINUED | OUTPATIENT
Start: 2022-10-11 | End: 2022-10-11 | Stop reason: SURG

## 2022-10-11 RX ORDER — ROPIVACAINE HYDROCHLORIDE 5 MG/ML
INJECTION, SOLUTION EPIDURAL; INFILTRATION; PERINEURAL
Status: COMPLETED | OUTPATIENT
Start: 2022-10-11 | End: 2022-10-11

## 2022-10-11 RX ORDER — LIDOCAINE HYDROCHLORIDE 20 MG/ML
INJECTION, SOLUTION EPIDURAL; INFILTRATION; INTRACAUDAL; PERINEURAL PRN
Status: DISCONTINUED | OUTPATIENT
Start: 2022-10-11 | End: 2022-10-11 | Stop reason: SURG

## 2022-10-11 RX ORDER — ALBUTEROL SULFATE 2.5 MG/3ML
2.5 SOLUTION RESPIRATORY (INHALATION)
Status: DISCONTINUED | OUTPATIENT
Start: 2022-10-11 | End: 2022-10-11 | Stop reason: HOSPADM

## 2022-10-11 RX ORDER — CEFAZOLIN SODIUM 1 G/3ML
INJECTION, POWDER, FOR SOLUTION INTRAMUSCULAR; INTRAVENOUS PRN
Status: DISCONTINUED | OUTPATIENT
Start: 2022-10-11 | End: 2022-10-11 | Stop reason: SURG

## 2022-10-11 RX ORDER — LABETALOL HYDROCHLORIDE 5 MG/ML
5 INJECTION, SOLUTION INTRAVENOUS
Status: DISCONTINUED | OUTPATIENT
Start: 2022-10-11 | End: 2022-10-11 | Stop reason: HOSPADM

## 2022-10-11 RX ORDER — HYDRALAZINE HYDROCHLORIDE 20 MG/ML
5 INJECTION INTRAMUSCULAR; INTRAVENOUS
Status: DISCONTINUED | OUTPATIENT
Start: 2022-10-11 | End: 2022-10-11 | Stop reason: HOSPADM

## 2022-10-11 RX ORDER — OXYCODONE HCL 5 MG/5 ML
10 SOLUTION, ORAL ORAL
Status: COMPLETED | OUTPATIENT
Start: 2022-10-11 | End: 2022-10-11

## 2022-10-11 RX ORDER — SODIUM CHLORIDE, SODIUM LACTATE, POTASSIUM CHLORIDE, CALCIUM CHLORIDE 600; 310; 30; 20 MG/100ML; MG/100ML; MG/100ML; MG/100ML
INJECTION, SOLUTION INTRAVENOUS CONTINUOUS
Status: ACTIVE | OUTPATIENT
Start: 2022-10-11 | End: 2022-10-11

## 2022-10-11 RX ADMIN — OXYCODONE HYDROCHLORIDE 10 MG: 5 SOLUTION ORAL at 17:13

## 2022-10-11 RX ADMIN — FENTANYL CITRATE 50 MCG: 50 INJECTION, SOLUTION INTRAMUSCULAR; INTRAVENOUS at 17:12

## 2022-10-11 RX ADMIN — LIDOCAINE HYDROCHLORIDE 100 MG: 20 INJECTION, SOLUTION EPIDURAL; INFILTRATION; INTRACAUDAL at 15:22

## 2022-10-11 RX ADMIN — LIDOCAINE HYDROCHLORIDE 0.5 ML: 10 INJECTION, SOLUTION EPIDURAL; INFILTRATION; INTRACAUDAL; PERINEURAL at 14:19

## 2022-10-11 RX ADMIN — PHENYLEPHRINE HYDROCHLORIDE 200 MCG: 10 INJECTION INTRAVENOUS at 16:10

## 2022-10-11 RX ADMIN — GABAPENTIN 300 MG: 300 CAPSULE ORAL at 14:19

## 2022-10-11 RX ADMIN — PHENYLEPHRINE HYDROCHLORIDE 100 MCG: 10 INJECTION INTRAVENOUS at 15:38

## 2022-10-11 RX ADMIN — SODIUM CHLORIDE, POTASSIUM CHLORIDE, SODIUM LACTATE AND CALCIUM CHLORIDE: 600; 310; 30; 20 INJECTION, SOLUTION INTRAVENOUS at 14:19

## 2022-10-11 RX ADMIN — FENTANYL CITRATE 100 MCG: 50 INJECTION, SOLUTION INTRAMUSCULAR; INTRAVENOUS at 15:20

## 2022-10-11 RX ADMIN — CEFAZOLIN 2 G: 330 INJECTION, POWDER, FOR SOLUTION INTRAMUSCULAR; INTRAVENOUS at 15:23

## 2022-10-11 RX ADMIN — FENTANYL CITRATE 50 MCG: 50 INJECTION, SOLUTION INTRAMUSCULAR; INTRAVENOUS at 17:23

## 2022-10-11 RX ADMIN — PHENYLEPHRINE HYDROCHLORIDE 100 MCG: 10 INJECTION INTRAVENOUS at 15:34

## 2022-10-11 RX ADMIN — PROPOFOL 150 MG: 10 INJECTION, EMULSION INTRAVENOUS at 15:22

## 2022-10-11 RX ADMIN — PHENYLEPHRINE HYDROCHLORIDE 100 MCG: 10 INJECTION INTRAVENOUS at 15:43

## 2022-10-11 RX ADMIN — FENTANYL CITRATE 50 MCG: 50 INJECTION, SOLUTION INTRAMUSCULAR; INTRAVENOUS at 15:45

## 2022-10-11 RX ADMIN — HALOPERIDOL LACTATE 1 MG: 5 INJECTION, SOLUTION INTRAMUSCULAR at 18:10

## 2022-10-11 RX ADMIN — ONDANSETRON 4 MG: 2 INJECTION INTRAMUSCULAR; INTRAVENOUS at 17:13

## 2022-10-11 RX ADMIN — FENTANYL CITRATE 25 MCG: 50 INJECTION, SOLUTION INTRAMUSCULAR; INTRAVENOUS at 17:33

## 2022-10-11 RX ADMIN — MIDAZOLAM HYDROCHLORIDE 2 MG: 1 INJECTION, SOLUTION INTRAMUSCULAR; INTRAVENOUS at 14:59

## 2022-10-11 RX ADMIN — ROPIVACAINE HYDROCHLORIDE 25 ML: 5 INJECTION, SOLUTION EPIDURAL; INFILTRATION; PERINEURAL at 15:00

## 2022-10-11 RX ADMIN — PHENYLEPHRINE HYDROCHLORIDE 200 MCG: 10 INJECTION INTRAVENOUS at 16:25

## 2022-10-11 RX ADMIN — ACETAMINOPHEN 1000 MG: 500 TABLET ORAL at 14:19

## 2022-10-11 ASSESSMENT — FIBROSIS 4 INDEX: FIB4 SCORE: 0.74

## 2022-10-11 ASSESSMENT — PAIN DESCRIPTION - PAIN TYPE: TYPE: SURGICAL PAIN

## 2022-10-11 ASSESSMENT — PAIN SCALES - GENERAL: PAIN_LEVEL: 4

## 2022-10-11 NOTE — DISCHARGE INSTR - OTHER INFO
Keep brace on and locked for 24 hours with no weight on the leg.  After 24 hours it is ok to unlock the brace and begin bearing weight as tolerated in the braced knee.  After 24 hours it is ok to remove the brace while in bed and use it only when attempting to walk on the leg.  Ice and elevate the knee regularly over the first week after surgery.    Keep dressings on for 2 days.  After two days, you may shower with wounds uncovered using normal soap and water.  KEEP STERI STRIPS IN PLACE UNTIL POST OP APPOINTMENT.  Apply band aids to wounds after shower. DO NOT SUBMERGE INCISIONS for 3 weeks.  You do not need to put the stocking back on after 2 days.

## 2022-10-11 NOTE — ANESTHESIA PREPROCEDURE EVALUATION
Case: [id number] Date/Time: 10/11/22 1530    Procedure: Left knee arthroscopy with autograft hamstring anterior cruciate ligament reconstruction and repairs as indicated    Diagnosis: ACL (anterior cruciate ligament) rupture [S83.519A]    Pre-op diagnosis: ACL (anterior cruciate ligament) rupture [S83.519A]    Location:  OR 04 / SURGERY [facility]    Surgeons: Rocco Chacon M.D.          Relevant Problems   No relevant active problems       Physical Exam    Airway   Mallampati: II  TM distance: >3 FB  Neck ROM: full       Cardiovascular - normal exam  Rhythm: regular  Rate: normal  (-) murmur     Dental - normal exam           Pulmonary - normal exam  Breath sounds clear to auscultation     Abdominal    Neurological - normal exam                 Anesthesia Plan    ASA 2       Plan - general and peripheral nerve block     Peripheral nerve block will be post-op pain control  Airway plan will be ETT          Induction: intravenous    Postoperative Plan: Postoperative administration of opioids is intended.    Pertinent diagnostic labs and testing reviewed    Informed Consent:    Anesthetic plan and risks discussed with patient.    Use of blood products discussed with: patient whom consented to blood products.

## 2022-10-11 NOTE — OR NURSING
1649: Pt arrived to PACU. Respirations even and unlabored. VSS. Dressing clean, dry and intact.      1704: does not rouse to verbal stimuli, respirations even and unlabored    1710: opens eyes to verbal stimuli and able to follow commands, oral airway removed    1712: c/o nausea and surgical pain 8/10, given Zofran 4mg, Fentanyl 50mcg, Oxycodone oral solution 10mg    1723: pain 7/10 and given Fentanyl 50mcg    1733: pain 6/10 and given Fentanyl 25mcg    1745: pain improved with medication, respirations even and unlabored, denies nausea.    1750: Friend updated and on his way back to waiting area, meets criteria for stage II    1756: Report given to Nomi stage II    1802: transported to stage II

## 2022-10-11 NOTE — OP REPORT
SURGEON:  Rocco Chacon M.D.    PREOPERATIVE DIAGNOSIS:    Left knee anterior cruciate ligament tear secondary to chronic tibial spine avulsion fracture.    POSTOPERATIVE DIAGNOSIS:  Left knee anterior cruciate ligament tear secondary to chronic tibial spine avulsion fracture.  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  Left knee arthroscopic partial lateral meniscectomy  Left knee arthroscopic chondroplasty  Left knee arthroscopic loose body removal    ASSISTANT:  Nemo Villasenor PA-C    ANESTHESIA:   General and an adductor canal nerve block.    ANESTHESIOLOGIST: Sawyer Bauman MD    IMPLANTS:  Two Lakin ProCinch buttons, one Lakin tibial extendo-button    COMPLICATIONS:  None.    DISPOSITION:  Stable to Post Anesthesia Care Unit.    INDICATIONS:  The patient sustained an ACL tear and has had recurrent instability.  The risks, benefits, alternatives, and limitations of surgical intervention were discussed in detail.  They expressed understanding and desire to proceed.    PROCEDURE IN DETAIL:  The patient and the correct operative extremity were identified in the preoperative area.  The knee was marked.  The patient was brought to the operating room where the correct operative extremity was again confirmed.  They were placed supine on the OR table after undergoing an adductor canal nerve block performed at my request for postoperative pain control.  General anesthesia was then induced without complication.  Examination under anesthesia showed a 2+ Lachman, 2+ pivot shift, no medial or lateral instability.  The knee was prepped with alcohol  and injected with 30 mL of 1% lidocaine with epinephrine.  The knee was then prepped and draped in the usual sterile fashion using ChloraPrep.    A diagnostic arthroscopy was then performed, which showed grade II chondromalacia of the articular cartilage of the patella and grade II/III chondromalacia of the articular cartilage of the trochlea.  The notch showed a chronically torn ACL with very redundant fibers attached to a large osseous loose body from the chronic tibial spine avulsion fracture.  The medial compartment showed diffuse grade II/III chondromalacia of the medial femoral condyle and grade II chondromalacia of the medial tibial plateau with an intact meniscus.  Chondroplasty was performed of the medial femoral condyle, the medial tibial plateau the patella and the trochlea.  The lateral compartment showed radial tearing of the anterior horn of the lateral meniscus and grade II chondromalacia of the lateral femoral condyle and the lateral tibial plateau.  A partial lateral meniscectomy was performed with the arthroscopic shaver and chondroplasty performed of the lateral femoral condyle and the lateral tibial plateau.  Large osseous loose body was removed from the notch in piecemeal fashion from an extended and expanded medial portal.      The scope was then withdrawn and a short longitudinal incision was placed over the anteromedial aspect of the knee.  Sharp dissection was carried down to the level of the sartorius fascia.  The semitendinosus was then harvested in standard fashion, and the gracilis was tagged for later repair.  The graft was then prepared to pass through the appropriate size bone tunnels. Once prepared, the graft was then tensioned at the back table until the tunnels were prepared for graft passage.    Then, we reintroduced the scope, used an over-the-top guide to drill a guide pin, followed by an Endobutton reamer, then an 8.5 mm [place] reamer for the femoral tunnel.  We then used an ACL  guide to place a guide pin, followed by a straight 8.5 mm reamer for the tibial tunnel. We then passed the graft in standard fashion, flipping the ProCinch button under direct vision.  We then took the knee through a full range of motion.  There was no notch or roof impingement.  We then removed the visible bony debris from the knee, withdrew the scope and removed the fluid.  We conditioned the graft by taking the knee through a full range of motion multiple times and brought the knee out into about 20 degrees of flexion, placed a posterior Lachman while we tensioned the tibial pro cinch over an extendo-button on the anteromedial tibia. We then had a negative Lachman, negative pivot shift.  We then held a posterior Lachman again while the femoral ProCinch was re-tensioned.    We then copiously irrigated the wound, and closed the deep subcutaneous tissues of the tibial incision with Monocryl.  Benzoin and Steri-Strips were placed over the incision.  The portals were closed with 3-0 nylon.  The knee was injected with 0.5% bupivacaine with epinephrine.  Sterile dressings were applied.  The patient's knee was loosely overwrapped with an Ace wrap.  A REMINGTON stocking was placed.  A hinged knee brace was placed.  The patient was then allowed to awake from anesthesia, transferred to their hospital cart, and taken to the Post Anesthesia Care Unit in stable condition.  The patient tolerated the procedure well.  There were no immediate complications.

## 2022-10-11 NOTE — LETTER
August 30, 2022    Patient Name: Brayan Archibald  Surgeon Name: Rocco Chacon M.D.  Surgery Facility: [facility] ([address] [facility])  Surgery Date: 10/11/2022    The time of your surgery is not final and may change up to and until the day of your surgery. You will be contacted 24-48 hours prior to your surgery date with your check-in and surgery time.    If you will not be at one of the below numbers please call the surgery scheduler at [phone]  Preferred Phone: [phone]    BEFORE YOUR SURGERY   Pre Registration and/or Lab Work must be done within and no earlier than 28 days prior to your surgery date. Please call [facility] at [phone] for an appointment as soon as possible.    Not scheduled at [facility] contact the scheduled facility for approved testing facilities.    Pre op Appointment:   Date: 10/05/2022   Time: 9 AM    Provider: Rocco Chacon M.D.   Location: [address] [place] [id number]  Instructions: Bring a list of all medications you are taking including the dosing and frequency.    DAY OF YOUR SURGERY  Nothing to eat or drink after midnight     Refrain from smoking any substance after midnight prior to surgery. Smoking may interfere with the anesthetic and frequently produces nausea during the recovery period.    Continue taking all lifesaving medications. Including the morning of your surgery with small sip of water.    Please arrive at the hospital/surgery center at the check-in time provided.     An adult will need to bring you and take you home after your surgery.     AFTER YOUR SURGERY  Post op Appointment:   Date: 10/24/2022   Time: 10:45 AM    With: Rocco Chacon M.D.   Location: [address] [place] [id number]    - Therapy- Your first appointment should be 8  day(s) after your surgery. For your convenience we have 4 Physical Therapy locations: [facility], [place], and [facility]. Call our office  ASAP to schedule an appointment at [phone] or take the enclosed Therapy Prescription to a facility of your choice.    TIME OFF WORK  FMLA or Disability forms can be faxed directly to: [phone] or you may drop them off at [address] [place] [address]. Our office charges a $35.00 fee per form. Forms will be completed within 10 business days of drop off and payment received. For the status of your forms you may contact our disability office directly [phone].    MEDICATION INSTRUCTIONS **Please read section completely**    The following medications should be stopped a minimum of 10 days prior to surgery:  All over the counter, Supplements & Herbal medications    Anorectics: Phentermine (Adipex-P, Lomaira and Suprenza), Phentermine-topiramate (Qsymia), Bupropion-naltrexone (Contrave)    Opiod Partial Agonists/Opioid Antagonists: Buprenorphine (Subocone, Belbuca, Butrans, Probuphine Implant, Sublocade), Naltrexone (ReVia, Vivitrol), Naloxone    Amphetamines: Dextroamphetamine/Amphetamine (Adderall, Mydayis), Methylphenidate Hydrochloride (Concerta, Metadate, Methylin, Ritalin)    The following medications should be stopped 5 days prior to surgery:  Blood Thinners: Any Aspirin, Aspirin products, anti-inflammatories such as ibuprofen and any blood thinners such as Coumadin and Plavix. Please consult your prescribing physician if you are on life saving blood thinners, in regards to when to stop medications prior to surgery.     The following medications should be stopped a minimum of 3 days prior to surgery:  PDE-5 inhibitors: Sildenafil (Viagra), Tadalafil (Cialis), Vardenafil (Levitra), Avanafil (Stendra)    MAO Inhibitors: Rasagiline (Azilect), Selegiline (Eldepryl, Emsam, Selapar), Isocarboxazid (Marplan), Phenelzine (Nardil)    You can use [facility] to message your Care Team. Don't have a [facility] account? Sign up here:

## 2022-10-11 NOTE — ANESTHESIA PROCEDURE NOTES
Peripheral Block    Date/Time: 10/11/2022 3:00 PM  Performed by: Sawyer Buckley M.D.  Authorized by: Sawyer Buckley M.D.     Start Time:  10/11/2022 3:00 PM  End Time:  10/11/2022 3:03 PM  Reason for Block: at surgeon's request and post-op pain management ONLY    patient identified, IV checked, site marked, risks and benefits discussed, surgical consent, monitors and equipment checked, pre-op evaluation and timeout performed    Patient Position:  Supine  Prep: ChloraPrep    Monitoring:  Heart rate, continuous pulse ox and cardiac monitor  Block Region:  Lower Extremity  Lower Extremity - Block Type:  Selective FEMORAL nerve block at the Adductor Canal    Laterality:  Left  Procedures: ultrasound guided  Image captured, interpreted and electronically stored.  Local Infiltration:  Lidocaine  Strength:  1 %  Dose:  3 ml  Block Type:  Single-shot  Needle Length:  100mm  Needle Gauge:  21 G  Needle Localization:  Ultrasound guidance  Injection Assessment:  Negative aspiration for heme, no paresthesia on injection, incremental injection and local visualized surrounding nerve on ultrasound  Evidence of intravascular injection: No     US Guided Selective Femoral Nerve Block at Adductor Canal:   US probe placed at mid-thigh level on externally rotated leg and femur identified.  Probe directed medially until Sartorius Muscle (SM), Femoral Artery (FA) and Saphenous Nerve (SN) identified in Adductor Canal (AC).  Needle inserted anterolateral to probe in an in plane approach into a subsartorial perivascular perineural position.  After negative aspiration LA injected with ease and visualized spreading within the AC.    With epinephrine 5mcg/ml

## 2022-10-11 NOTE — ANESTHESIA TIME REPORT
Anesthesia Start and Stop Event Times     Date Time Event    10/11/2022 1509 Ready for Procedure     1519 Anesthesia Start     1652 Anesthesia Stop        Responsible Staff  10/11/22    Name Role Begin End    Sawyer Buckley M.D. Anesth 1519 1652        Overtime Reason:  no overtime (within assigned shift)    Comments:

## 2022-10-11 NOTE — DISCHARGE INSTRUCTIONS
MD Chacon Discharge Instructions:   Keep brace on and locked for 24 hours with no weight on the leg.    After 24 hours it is ok to unlock the brace and begin bearing weight as tolerated in the braced knee.    After 24 hours it is ok to remove the brace while in bed and use it only when attempting to walk on the leg.    Ice and elevate the knee regularly over the first week after surgery. Keep dressings on for 2 days.    After two days, you may shower with wounds uncovered using normal soap and water.    KEEP STERI STRIPS IN PLACE UNTIL POST OP APPOINTMENT.    Apply band aids to wounds after shower.   DO NOT SUBMERGE INCISIONS for 3 weeks.    You do not need to put the stocking back on after 2 days.    If any questions arise, call your provider, Rocco Chaocn M.D. [phone].  If your provider is not available, please feel free to call the Surgical Center at [phone].    MEDICATIONS: Resume taking daily medication.  Take prescribed pain medication with food.  If no medication is prescribed, you may take non-aspirin pain medication if needed.  PAIN MEDICATION CAN BE VERY CONSTIPATING.  Take a stool softener or laxative such as senokot, pericolace, or milk of magnesia if needed.    Last pain medication given at: Oxycodone 10mg Oral Solution at 5:12pm    What to Expect Post Anesthesia    Rest and take it easy for the first 24 hours.  A responsible adult is recommended to remain with you during that time.  It is normal to feel sleepy.  We encourage you to not do anything that requires balance, judgment or coordination.    FOR 24 HOURS DO NOT:  Drive, operate machinery or run household appliances.  Drink beer or alcoholic beverages.  Make important decisions or sign legal documents.    To avoid nausea, slowly advance diet as tolerated, avoiding spicy or greasy foods for the first day.  Add more substantial food to your diet according to your provider's instructions. INCREASE FLUIDS AND FIBER TO AVOID  CONSTIPATION.    MILD FLU-LIKE SYMPTOMS ARE NORMAL.  YOU MAY EXPERIENCE GENERALIZED MUSCLE ACHES, THROAT IRRITATION, HEADACHE AND/OR SOME NAUSEA.    Knee Arthroscopy, Care After  This sheet gives you information about how to care for yourself after your procedure. Your health care provider may also give you more specific instructions. If you have problems or questions, contact your health care provider.  What can I expect after the procedure?  After the procedure, it is common to have:  Soreness.  Swelling.  Pain that can be relieved by taking pain medicine.  Follow these instructions at home:  Incision care    Follow instructions from your health care provider about how to take care of your incisions. Make sure you:  Wash your hands with soap and water before you change your bandage (dressing). If soap and water are not available, use hand .  Change your dressing as told by your health care provider.  Leave stitches (sutures), staples, skin glue, or adhesive strips in place. These skin closures may need to stay in place for 2 weeks or longer. If adhesive strip edges start to loosen and curl up, you may trim the loose edges. Do not remove adhesive strips completely unless your health care provider tells you to do that.  Check your incision areas every day for signs of infection. Check for:  Redness.  More swelling or pain.  Fluid or blood.  Warmth.  Pus or a bad smell.  Bathing  Do not take baths, swim, or use a hot tub until your health care provider approves. Ask your health care provider if you may take showers. You may only be allowed to take sponge baths.  Activity  Do not use your knee to support your body weight until your health care provider says that you can. Follow weight-bearing restrictions as told. Use crutches or other devices to help you move around (assistive devices) as directed.  Ask your health care provider what activities are safe for you during recovery, and what activities you need  to avoid.  If physical therapy was prescribed, do exercises as directed. Doing exercises may help improve knee movement and flexibility (range of motion).  Do not lift anything that is heavier than 10 lb (4.5 kg), or the limit that you are told, until your health care provider says that it is safe.  Driving  Do not drive until your health care provider approves. You may be able to drive after 1-3 weeks.  Do not drive or use heavy machinery while taking prescription pain medicine.  Managing pain, stiffness, and swelling  If directed, put ice on the injured area:  Put ice in a plastic bag or use the icing device (cold therapy unit) that you were given. Follow instructions from your health care provider about how to use the icing device.  Place a towel between your skin and the bag or between your skin and the icing device.  Leave the ice on for 20 minutes, 2-3 times a day.  Move your toes often to avoid stiffness and to lessen swelling.  Raise (elevate) the injured area above the level of your heart while you are sitting or lying down.  If you are taking blood thinners:  Before you take any medicines that contain aspirin or NSAIDs, talk with your health care provider. These medicines increase your risk for dangerous bleeding.  Take your medicine exactly as told, at the same time every day.  Avoid activities that could cause injury or bruising, and follow instructions about how to prevent falls.  Wear a medical alert bracelet or carry a card that lists what medicines you take.  General instructions  Take over-the-counter and prescription medicines only as told by your health care provider.  If you are taking prescription pain medicine, take actions to prevent or treat constipation. Your health care provider may recommend that you:  Drink enough fluid to keep your urine pale yellow.  Eat foods that are high in fiber, such as fresh fruits and vegetables, whole grains, and beans.  Limit foods that are high in fat and  processed sugars, such as fried or sweet foods.  Take an over-the-counter or prescription medicines for constipation.  Do not use any products that contain nicotine or tobacco, such as cigarettes and e-cigarettes. These can delay incision or bone healing. If you need help quitting, ask your health care provider.  Wear compression stockings as told by your health care provider. These stockings help to prevent blood clots and reduce swelling in your legs.  Keep all follow-up visits as told by your health care provider. This is important.  Contact a health care provider if you:  Have a fever.  Have severe pain.  Have redness around an incision.  Have more swelling.  Have fluid or blood coming from an incision.  Notice that an incision feels warm to the touch.  Notice pus or a bad smell coming from an incision.  Notice that an incision opens up.  Develop a rash.  Get help right away if you:  Have difficulty breathing.  Have shortness of breath.  Have chest pain.  Develop pain in your lower leg or at the back of your knee.  Have numbness or tingling in your lower leg or your foot.  Summary  Raise (elevate) the injured area above the level of your heart while you are sitting or lying down.  To help relieve pain and swelling, put ice on your leg for 20 minutes at a time, 2-3 times a day.  If you were prescribed a blood thinner, avoid activities that could cause injury or bruising, and follow instructions about how to prevent falls.  If physical therapy was prescribed, do exercises as directed. Doing exercises may help improve range of motion.  This information is not intended to replace advice given to you by your health care provider. Make sure you discuss any questions you have with your health care provider.  Document Released: 07/07/2006 Document Revised: 11/30/2018 Document Reviewed: 10/31/2018  Elsevier Patient Education © 2020 [employer]

## 2022-10-11 NOTE — ANESTHESIA PROCEDURE NOTES
Airway    Date/Time: 10/11/2022 3:23 PM  Performed by: Sawyer Buckley M.D.  Authorized by: Sawyer Buckley M.D.     Location:  OR  Urgency:  Elective  Indications for Airway Management:  Anesthesia      Spontaneous Ventilation: absent    Sedation Level:  Deep  Preoxygenated: Yes    Final Airway Type:  Supraglottic airway  Final Supraglottic Airway:  Standard LMA    SGA Size:  4  Number of Attempts at Approach:  1  Ventilation Between Attempts:  None  Number of Other Approaches Attempted:  0

## 2022-10-12 NOTE — ANESTHESIA POSTPROCEDURE EVALUATION
Patient: Brayan Archibald    Procedure Summary     Date: 10/11/22 Room / Location:  OR 04 / SURGERY [facility]    Anesthesia Start: 1519 Anesthesia Stop: 1652    Procedure: Left knee arthroscopy with autograft hamstring anterior cruciate ligament reconstruction and repairs as indicated (Left: Knee) Diagnosis:       ACL (anterior cruciate ligament) rupture      (ACL (anterior cruciate ligament) rupture [S83.519A])    Surgeons: Rocco Chacon M.D. Responsible Provider: Sawyer Buckley M.D.    Anesthesia Type: general, peripheral nerve block ASA Status: 2          Final Anesthesia Type: general, peripheral nerve block  Last vitals  BP   Blood Pressure: 122/71    Temp   36.2 °C (97.2 °F)    Pulse   92   Resp   18    SpO2   90 %      Anesthesia Post Evaluation    Patient location during evaluation: PACU  Patient participation: complete - patient participated  Level of consciousness: awake and alert  Pain score: 4    Airway patency: patent  Anesthetic complications: no  Cardiovascular status: hemodynamically stable  Respiratory status: acceptable  Hydration status: euvolemic    PONV: none          No notable events documented.     Nurse Pain Score: 4 (NPRS)

## 2022-10-12 NOTE — OR NURSING
1802: Patient arrived to phase II from PACU 1 via gurney. Report received from RN. Respirations are spontaneous and unlabored. Dressing is CDI. VSS on RA. Slightly nauseous, plan on haldol    1815: Family at bedside.     1835: Patient education completed, family denies further questions. DC'd to care of family post uneventful stay in PACU 2.

## 2022-10-13 NOTE — H&P
Surgery Orthopedic History & Physical Note    Date  10/13/2022    Primary Care Physician  Guevara Hennessy, KARI CHAVEZ  Pre-Op Diagnosis Codes:     * ACL (anterior cruciate ligament) rupture [S83.519A]    HPI  This is a 42 y.o. male who presented with left knee instability    Past Medical History:   Diagnosis Date    Anxiety     Depression     Pneumothorax 03/11/2021    with fall, rib fractures       Past Surgical History:   Procedure Laterality Date    PB KNEE SCOPE,AID ANT CRUCIATE REPAIR Left 10/11/2022    Procedure: Left knee arthroscopy with autograft hamstring anterior cruciate ligament reconstruction and repairs as indicated;  Surgeon: Rocco Chacon M.D.;  Location: SURGERY [facility];  Service: Orthopedics       No current facility-administered medications for this encounter.     Current Outpatient Medications   Medication Sig Dispense Refill    ibuprofen (MOTRIN) 600 MG Tab Take 1 Tablet by mouth every 6 hours as needed for Moderate Pain. 30 Tablet 0    hydrOXYzine HCl (ATARAX) 10 MG Tab Take 10 mg by mouth 4 times a day as needed. Indications: Feeling Anxious      escitalopram (LEXAPRO) 10 MG Tab Take 10 mg by mouth every day.      acetaminophen (TYLENOL) 500 MG Tab Take 2 Tablets by mouth every 6 hours as needed for Mild Pain. 30 tablet 0    diphenhydrAMINE (BENADRYL) 25 MG Tab Take 1 tablet by mouth every four hours as needed. 30 tablet 0       Social History     Socioeconomic History    Marital status: Single     Spouse name: Not on file    Number of children: Not on file    Years of education: Not on file    Highest education level: Not on file   Occupational History    Not on file   Tobacco Use    Smoking status: Every Day     Packs/day: 0.50     Years: 10.00     Pack years: 5.00     Types: Cigarettes    Smokeless tobacco: Never    Tobacco comments:     Half pack cigarettes a day   Vaping Use    Vaping Use: Former    Substances: THC   Substance and Sexual Activity    Alcohol use: Not Currently      Comment: June 15th, 2022 [facility]. Used to drink 2-3 drinks per day or more    Drug use: Not Currently     Types: Inhaled     Comment: marijuana    Sexual activity: Not on file   Other Topics Concern    Not on file   Social History Narrative    ** Merged History Encounter **          Social Determinants of Health     Financial Resource Strain: Not on file   Food Insecurity: Not on file   Transportation Needs: Not on file   Physical Activity: Not on file   Stress: Not on file   Social Connections: Not on file   Intimate Partner Violence: Not on file   Housing Stability: Not on file       No family history on file.    Allergies  Patient has no known allergies.    Review of Systems  Negative    Physical Exam  Vitals and nursing note reviewed.   Constitutional:       Appearance: Normal appearance.   HENT:      Head: Normocephalic and atraumatic.   Eyes:      Pupils: Pupils are equal, round, and reactive to light.   Cardiovascular:      Rate and Rhythm: Normal rate.   Pulmonary:      Effort: Pulmonary effort is normal.   Musculoskeletal:         General: Normal range of motion.   Skin:     General: Skin is warm.   Neurological:      General: No focal deficit present.      Mental Status: He is alert.   Psychiatric:         Mood and Affect: Mood normal.       Vital Signs  Blood Pressure: 122/71   Temperature: 36.2 °C (97.2 °F)   Pulse: 92   Respiration: 18   Pulse Oximetry: 90 %       Labs:                    Radiology:  No orders to display         Assessment/Plan:  Pre-Op Diagnosis Codes:     * ACL (anterior cruciate ligament) rupture [S83.519A]  Procedure(s):  Left knee arthroscopy with autograft hamstring anterior cruciate ligament reconstruction and repairs as indicated

## 2022-12-14 ENCOUNTER — NON-PROVIDER VISIT (OUTPATIENT)
Dept: OCCUPATIONAL MEDICINE | Facility: CLINIC | Age: 42
End: 2022-12-14

## 2022-12-14 DIAGNOSIS — Z02.1 PRE-EMPLOYMENT DRUG SCREENING: ICD-10-CM

## 2022-12-14 LAB
AMP AMPHETAMINE: NORMAL
COC COCAINE: NORMAL
INT CON NEG: NORMAL
INT CON POS: NORMAL
MET METHAMPHETAMINES: NORMAL
OPI OPIATES: NORMAL
PCP PHENCYCLIDINE: NORMAL
POC DRUG COMMENT 753798-OCCUPATIONAL HEALTH: NEGATIVE
THC: NORMAL

## 2022-12-14 PROCEDURE — 80305 DRUG TEST PRSMV DIR OPT OBS: CPT | Performed by: PREVENTIVE MEDICINE

## 2023-01-11 NOTE — PROGRESS NOTES
Trauma / Surgical Daily Progress Note    Date of Service  3/15/2021    Chief Complaint  40 y.o. male admitted 3/11/2021 with Right pneumothorax with rib fractures    Interval Events    CXR with new small right pneumothorax not seen on yesterdays CXR.     - Second attempt at water sealing chest tube with the subsequent development of pneumothorax on follow up CXR imaging.   - Chest returned to suction.   - Counseled.     Review of Systems  Review of Systems   Constitutional: Negative for chills and fever.   HENT: Negative for hearing loss.    Eyes: Negative for blurred vision and double vision.   Respiratory: Negative for shortness of breath.    Cardiovascular: Negative for chest pain.   Gastrointestinal: Negative for abdominal pain, constipation (3/14 (+) BM), nausea and vomiting.   Genitourinary: Negative for dysuria.   Musculoskeletal: Positive for myalgias (chest tube insertion site / rib fractures).   Skin: Negative for rash.   Neurological: Negative for sensory change, speech change and focal weakness.   Psychiatric/Behavioral: Positive for substance abuse.        Vital Signs  Temp:  [36 °C (96.8 °F)-36.7 °C (98 °F)] 36.6 °C (97.9 °F)  Pulse:  [66-88] 81  Resp:  [16-18] 18  BP: (107-115)/(61-74) 111/71  SpO2:  [93 %-100 %] 94 %    Physical Exam  Physical Exam  Vitals and nursing note reviewed.   Constitutional:       General: He is awake. He is not in acute distress.     Appearance: Normal appearance. He is not ill-appearing or toxic-appearing.   HENT:      Head: Normocephalic.      Nose: Nose normal.      Mouth/Throat:      Mouth: Mucous membranes are moist.      Pharynx: Oropharynx is clear.   Eyes:      Extraocular Movements: Extraocular movements intact.      Conjunctiva/sclera: Conjunctivae normal.      Pupils: Pupils are equal, round, and reactive to light.   Cardiovascular:      Rate and Rhythm: Normal rate and regular rhythm.      Pulses: Normal pulses.      Heart sounds: Normal heart sounds.    Pulmonary:      Effort: Pulmonary effort is normal. No respiratory distress.      Breath sounds: Normal breath sounds.      Comments: Chest tube to water seal with no air leak.   Chest:      Chest wall: Tenderness present.   Abdominal:      General: There is no distension.      Tenderness: There is no abdominal tenderness. There is no guarding or rebound.   Musculoskeletal:         General: No swelling, tenderness or deformity. Normal range of motion.      Cervical back: Normal range of motion.   Skin:     General: Skin is warm and dry.      Capillary Refill: Capillary refill takes less than 2 seconds.   Neurological:      General: No focal deficit present.      Mental Status: He is alert.      GCS: GCS eye subscore is 4. GCS verbal subscore is 5. GCS motor subscore is 6.   Psychiatric:         Mood and Affect: Mood normal.         Behavior: Behavior normal. Behavior is cooperative.         Laboratory  No results found for this or any previous visit (from the past 24 hour(s)).    Fluids    Intake/Output Summary (Last 24 hours) at 3/15/2021 1007  Last data filed at 3/15/2021 0730  Gross per 24 hour   Intake 1080 ml   Output 85 ml   Net 995 ml       Core Measures & Quality Metrics  Labs reviewed, Medications reviewed and Radiology images reviewed  Gonzalez catheter: No Gonzalez      DVT Prophylaxis: Enoxaparin (Lovenox)  DVT prophylaxis - mechanical: SCDs  Ulcer prophylaxis: Not indicated    Assessed for rehab: Patient returned to prior level of function, rehabilitation not indicated at this time    RAP Score Total: 4    ETOH Screening  CAGE Score: 0  Assessment complete date: 3/12/2021        Assessment/Plan  Multiple rib fractures involving four or more ribs- (present on admission)  Assessment & Plan  Acute fractures of right 2nd through 7th ribs.  Aggressive multimodal pain management, and pulmonary hygiene.    Serial chest radiographs.      Traumatic hemopneumothorax- (present on admission)  Assessment &  Plan  Moderate right pneumothorax. Right hemothorax.  Tube thoracostomy placed in ED with follow up resolution of pneumothorax.  3/12 Chest tube to water   3/13 Chest tube returned to suction for slightly worse mild to moderate right apical pneumothorax.  3/14 CXR with no appreciable right pneumothorax. Chest tube to water seal.   3/15 CXR with small right pneumothorax. CT returned to suction. Total in pleura vac 420 cc. 24 hr output 150 cc.   Aggressive pulmonary hygiene. Serial chest radiographs.    No contraindication to deep vein thrombosis (DVT) prophylaxis- (present on admission)  Assessment & Plan  Prophylactic dose enoxaparin initiated upon admission.     Screening examination for infectious disease- (present on admission)  Assessment & Plan  Admission SARS-CoV-2 testing negative. Repeat SARS-CoV-2 testing not indicated. Isolation precautions de-escalated.    Trauma- (present on admission)  Assessment & Plan  Fell two days prior to admission.  Trauma Green Activation.  Eleazar Harvey MD. Trauma Surgery.        Discussed patient condition with Patient and trauma surgery, Dr. Eleazar Harvey.   [ILD] : ILD

## 2023-04-28 ENCOUNTER — NON-PROVIDER VISIT (OUTPATIENT)
Dept: OCCUPATIONAL MEDICINE | Facility: CLINIC | Age: 43
End: 2023-04-28

## 2023-04-28 DIAGNOSIS — Z02.1 PRE-EMPLOYMENT HEALTH SCREENING EXAMINATION: ICD-10-CM

## 2023-04-28 DIAGNOSIS — Z02.1 PRE-EMPLOYMENT DRUG SCREENING: ICD-10-CM

## 2023-04-28 PROCEDURE — 80305 DRUG TEST PRSMV DIR OPT OBS: CPT | Performed by: NURSE PRACTITIONER

## 2024-09-16 ENCOUNTER — HOSPITAL ENCOUNTER (OUTPATIENT)
Facility: MEDICAL CENTER | Age: 44
End: 2024-09-16
Attending: NURSE PRACTITIONER
Payer: MEDICAID

## 2024-09-16 ENCOUNTER — OFFICE VISIT (OUTPATIENT)
Dept: URGENT CARE | Facility: CLINIC | Age: 44
End: 2024-09-16
Payer: MEDICAID

## 2024-09-16 VITALS
WEIGHT: 174 LBS | HEIGHT: 67 IN | HEART RATE: 94 BPM | SYSTOLIC BLOOD PRESSURE: 128 MMHG | OXYGEN SATURATION: 98 % | BODY MASS INDEX: 27.31 KG/M2 | DIASTOLIC BLOOD PRESSURE: 80 MMHG | RESPIRATION RATE: 16 BRPM | TEMPERATURE: 97.6 F

## 2024-09-16 DIAGNOSIS — L03.317 CELLULITIS OF BUTTOCK: ICD-10-CM

## 2024-09-16 PROCEDURE — 99214 OFFICE O/P EST MOD 30 MIN: CPT | Performed by: NURSE PRACTITIONER

## 2024-09-16 PROCEDURE — 3074F SYST BP LT 130 MM HG: CPT | Performed by: NURSE PRACTITIONER

## 2024-09-16 PROCEDURE — 87070 CULTURE OTHR SPECIMN AEROBIC: CPT

## 2024-09-16 PROCEDURE — 3079F DIAST BP 80-89 MM HG: CPT | Performed by: NURSE PRACTITIONER

## 2024-09-16 PROCEDURE — 87077 CULTURE AEROBIC IDENTIFY: CPT

## 2024-09-16 PROCEDURE — 87205 SMEAR GRAM STAIN: CPT

## 2024-09-16 PROCEDURE — 87186 SC STD MICRODIL/AGAR DIL: CPT

## 2024-09-16 RX ORDER — SULFAMETHOXAZOLE/TRIMETHOPRIM 800-160 MG
1 TABLET ORAL 2 TIMES DAILY
Qty: 10 TABLET | Refills: 0 | Status: SHIPPED | OUTPATIENT
Start: 2024-09-16 | End: 2024-09-21

## 2024-09-16 ASSESSMENT — FIBROSIS 4 INDEX: FIB4 SCORE: 0.77

## 2024-09-17 LAB
GRAM STN SPEC: NORMAL
SIGNIFICANT IND 70042: NORMAL
SITE SITE: NORMAL
SOURCE SOURCE: NORMAL

## 2024-09-17 ASSESSMENT — ENCOUNTER SYMPTOMS
FEVER: 0
COUGH: 0
FATIGUE: 0
CHILLS: 0
SHORTNESS OF BREATH: 0

## 2024-09-17 NOTE — PROGRESS NOTES
"Subjective:   Brayan Archibald is a 44 y.o. male who presents for Sore (Buttocks )      Rash  This is a new problem. The current episode started in the past 7 days. The problem has been gradually worsening since onset. Location: buttock. The rash is characterized by redness and burning. He was exposed to nothing. Pertinent negatives include no cough, fatigue, fever or shortness of breath. The treatment provided no relief. There is no history of allergies.       Review of Systems   Constitutional:  Negative for chills, fatigue, fever and malaise/fatigue.   Respiratory:  Negative for cough and shortness of breath.    Skin:  Positive for rash.        Rash buttock          Medications:    sulfamethoxazole-trimethoprim    Allergies: Patient has no known allergies.    Problem List: Brayan Archibald does not have any pertinent problems on file.    Surgical History:  Past Surgical History:   Procedure Laterality Date    PB KNEE SCOPE,AID ANT CRUCIATE REPAIR Left 10/11/2022    Procedure: Left knee arthroscopy with autograft hamstring anterior cruciate ligament reconstruction and repairs as indicated;  Surgeon: Rocco Chacon M.D.;  Location: SURGERY [facility];  Service: Orthopedics       Past Social Hx: Brayan Archibald  reports that he has been smoking cigarettes. He has a 5 pack-year smoking history. He has never used smokeless tobacco. He reports that he does not currently use alcohol. He reports that he does not currently use drugs after having used the following drugs: Inhaled.     Past Family Hx:  Brayan Archibald family history is not on file.     Problem list, medications, and allergies reviewed by myself today in Epic.     Objective:     /80   Pulse 94   Temp 36.4 °C (97.6 °F) (Temporal)   Resp 16   Ht 1.702 m (5' 7\")   Wt 78.9 kg (174 lb)   SpO2 98%   BMI 27.25 kg/m²     Physical Exam  Constitutional:       Appearance: Normal appearance. He is not ill-appearing or toxic-appearing.   HENT:      " Head: Normocephalic.      Right Ear: External ear normal.      Left Ear: External ear normal.      Nose: Nose normal.      Mouth/Throat:      Lips: Pink.   Eyes:      General: Lids are normal.   Pulmonary:      Effort: Pulmonary effort is normal. No accessory muscle usage.   Musculoskeletal:      Cervical back: Full passive range of motion without pain.   Skin:     Findings: Erythema, rash and wound present. No abscess.          Neurological:      Mental Status: He is alert and oriented to person, place, and time.   Psychiatric:         Mood and Affect: Mood normal.         Thought Content: Thought content normal.           Assessment/Plan:     Diagnosis and associated orders:     1. Cellulitis of buttock  CULTURE WOUND W/ GRAM STAIN    sulfamethoxazole-trimethoprim (BACTRIM DS) 800-160 MG tablet         Comments/MDM:     I personally reviewed prior external notes and prior test results pertinent to today's visit.  No abscess present concerning of bacterial etiology wound culture obtained and started on Bactrim irrigated with NS and chlorhexidine Xeroform nonadherent gauze applied.  Wound care discussed.  Discussed management options, risks and benefits, and alternatives to treatment plan agreed upon.   Red flags discussed and indications to immediately call 911 or present to the Emergency Department.   Supportive care, differential diagnoses, and indications for immediate follow-up discussed with patient.    Patient expresses understanding and agrees to plan. Patient denies any other questions or concerns.                Please note that this dictation was created using voice recognition software. I have made a reasonable attempt to correct obvious errors, but I expect that there are errors of grammar and possibly content that I did not discover before finalizing the note.    This note was electronically signed by Bernardino FIGUEROA

## 2024-09-19 LAB
BACTERIA WND AEROBE CULT: ABNORMAL
BACTERIA WND AEROBE CULT: ABNORMAL
GRAM STN SPEC: ABNORMAL
SIGNIFICANT IND 70042: ABNORMAL
SITE SITE: ABNORMAL
SOURCE SOURCE: ABNORMAL

## (undated) DEVICE — SUCTION INSTRUMENT YANKAUER BULBOUS TIP W/O VENT (50EA/CA)

## (undated) DEVICE — BAG SPONGE COUNT 10.25 X 32 - BLUE (250/CA)

## (undated) DEVICE — CLOSURE SKIN STRIP 1/2 X 4 IN - (STERI STRIP) (50/BX 4BX/CA)

## (undated) DEVICE — SODIUM CHL. IRRIGATION 0.9% 3000ML (4EA/CA 65CA/PF)

## (undated) DEVICE — LACTATED RINGERS INJ 1000 ML - (14EA/CA 60CA/PF)

## (undated) DEVICE — TUBE CONNECTING SUCTION - CLEAR PLASTIC STERILE 72 IN (50EA/CA)

## (undated) DEVICE — DRAPE LARGE 3 QUARTER - (20/CA)

## (undated) DEVICE — ABLATOR WAND SERFAS 90-S CRUISE

## (undated) DEVICE — DRL BIT4.5 STR ENDOSCPC CANN

## (undated) DEVICE — TOWEL STOP TIMEOUT SAFETY FLAG (40EA/CA)

## (undated) DEVICE — PIN GUIDE ARTH FEM W/EYE 2.4 W/WIRE SDS=6 (8TX3=24) (6EA/BX)

## (undated) DEVICE — PACK MINOR BASIN - (2EA/CA)

## (undated) DEVICE — PADDING CAST 6 IN STERILE - 6 X 4 YDS (24/CA)

## (undated) DEVICE — GLOVE BIOGEL INDICATOR SZ 8 SURGICAL PF LTX - (50/BX 4BX/CA)

## (undated) DEVICE — CANISTER SUCTION RIGID RED 1500CC (40EA/CA)

## (undated) DEVICE — SODIUM CHL IRRIGATION 0.9% 1000ML (12EA/CA)

## (undated) DEVICE — BLADE SHAVER AGGRESSIVE PLUS 4.0MM ANGLED (5EA/BX)

## (undated) DEVICE — ELECTRODE DUAL RETURN W/ CORD - (50/PK)

## (undated) DEVICE — PIN GUIDE ARTHREX TIBIAL 2.4 SDS=6 (8TX3+1TX1+1TX2=27) (6EA/BX)

## (undated) DEVICE — GLOVE BIOGEL SZ 7 SURGICAL PF LTX - (50PR/BX 4BX/CA)

## (undated) DEVICE — STERI STRIP COMPOUND BENZOIN - TINCTURE 0.6ML WITH APPLICATOR (40EA/BX)

## (undated) DEVICE — SUTURE 3-0 ETHILON FS-1 - (36/BX) 30 INCH

## (undated) DEVICE — PACK KNEE ARTHROSCOPY SM OR - (2EA/CA)

## (undated) DEVICE — SHAVER 5.5 RESECTOR FORMULA (5EA/BX )

## (undated) DEVICE — SENSOR OXIMETER ADULT SPO2 RD SET (20EA/BX)

## (undated) DEVICE — GLOVE BIOGEL INDICATOR SZ 7.5 SURGICAL PF LTX - (50PR/BX 4BX/CA)

## (undated) DEVICE — SUTURE 2-0 MONOCRYL SH&UR-6 27 - (12/BX)

## (undated) DEVICE — GOWN WARMING STANDARD FLEX - (30/CA)

## (undated) DEVICE — WATER IRRIGATION STERILE 1000ML (12EA/CA)

## (undated) DEVICE — HUMID-VENT HEAT AND MOISTURE EXCHANGE- (50/BX)

## (undated) DEVICE — SUTURE 1 ETHIBOND OS-4 30 (36PK/BX)"

## (undated) DEVICE — TUBING DAY USE W/CARTRIDGE (10EA/BX)

## (undated) DEVICE — SUTURE PRELOADED #2 ULTRABRAID COBRAID (10EA/BX)

## (undated) DEVICE — SUTURE 1 VICRYL PLUS CT-1 - 18 INCH (12/BX)

## (undated) DEVICE — TUBING CASSETTE CROSSFLOW INTEGRATED (10EA/CA)

## (undated) DEVICE — SUTURE 2-0 VICRYL PLUS CT-2 - 27 INCH (36/BX)

## (undated) DEVICE — SYRINGE 30 ML LL (56/BX)

## (undated) DEVICE — NEEDLE SAFETY 18 GA X 1 1/2 IN (100EA/BX)

## (undated) DEVICE — CHLORAPREP 26 ML APPLICATOR - ORANGE TINT(25/CA)

## (undated) DEVICE — GLOVE, LITE (PAIR)

## (undated) DEVICE — GLOVE SZ 7.5 LF PROTEXIS (50PR/BX)

## (undated) DEVICE — DRAPE LOWER EXTREMETY - (6/CA)

## (undated) DEVICE — BLADE SURGICAL #15 - (50/BX 3BX/CA)

## (undated) DEVICE — SUTURE 4-0 MONOCRYL PLUS PS-2 - 27 INCH (36/BX)

## (undated) DEVICE — DRAPE U SPLIT IMP 54 X 76 - (24/CA)